# Patient Record
Sex: MALE | Race: WHITE
[De-identification: names, ages, dates, MRNs, and addresses within clinical notes are randomized per-mention and may not be internally consistent; named-entity substitution may affect disease eponyms.]

---

## 2018-05-06 ENCOUNTER — HOSPITAL ENCOUNTER (EMERGENCY)
Age: 73
Discharge: HOME | End: 2018-05-06

## 2018-05-06 ENCOUNTER — HOSPITAL ENCOUNTER (EMERGENCY)
Dept: HOSPITAL 91 - FTE | Age: 73
Discharge: HOME | End: 2018-05-06
Payer: MEDICARE

## 2018-05-06 DIAGNOSIS — E11.9: ICD-10-CM

## 2018-05-06 DIAGNOSIS — K59.00: ICD-10-CM

## 2018-05-06 DIAGNOSIS — K64.4: Primary | ICD-10-CM

## 2018-05-06 DIAGNOSIS — Z79.4: ICD-10-CM

## 2018-05-06 PROCEDURE — 99283 EMERGENCY DEPT VISIT LOW MDM: CPT

## 2018-05-06 RX ADMIN — GLYCERIN 1 SUPP: 2.1 SUPPOSITORY RECTAL at 11:12

## 2018-07-29 ENCOUNTER — HOSPITAL ENCOUNTER (INPATIENT)
Dept: HOSPITAL 12 - ER | Age: 73
LOS: 3 days | Discharge: NURSING, REHAB OR LONG-TERM CARE | DRG: 638 | End: 2018-08-01
Payer: MEDICARE

## 2018-07-29 VITALS — WEIGHT: 115 LBS | BODY MASS INDEX: 20.38 KG/M2 | HEIGHT: 63 IN

## 2018-07-29 VITALS — SYSTOLIC BLOOD PRESSURE: 113 MMHG | DIASTOLIC BLOOD PRESSURE: 62 MMHG

## 2018-07-29 VITALS — SYSTOLIC BLOOD PRESSURE: 123 MMHG | DIASTOLIC BLOOD PRESSURE: 67 MMHG

## 2018-07-29 DIAGNOSIS — N40.0: ICD-10-CM

## 2018-07-29 DIAGNOSIS — E86.0: ICD-10-CM
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
## 2018-07-29 DIAGNOSIS — F33.3: ICD-10-CM

## 2018-07-29 DIAGNOSIS — E83.42: ICD-10-CM

## 2018-07-29 DIAGNOSIS — E87.1: ICD-10-CM

## 2018-07-29 DIAGNOSIS — R51: ICD-10-CM

## 2018-07-29 DIAGNOSIS — E11.65: Primary | ICD-10-CM

## 2018-07-29 DIAGNOSIS — T50.1X5A: ICD-10-CM

## 2018-07-29 DIAGNOSIS — D49.7: ICD-10-CM

## 2018-07-29 LAB
BASOPHILS # BLD AUTO: 0 K/UL (ref 0–8)
BASOPHILS NFR BLD AUTO: 0.5 % (ref 0–2)
BUN SERPL-MCNC: 11 MG/DL (ref 7–18)
BUN SERPL-MCNC: 15 MG/DL (ref 7–18)
CHLORIDE SERPL-SCNC: 86 MMOL/L (ref 98–107)
CHLORIDE SERPL-SCNC: 94 MMOL/L (ref 98–107)
CO2 SERPL-SCNC: 26 MMOL/L (ref 21–32)
CO2 SERPL-SCNC: 28 MMOL/L (ref 21–32)
CREAT SERPL-MCNC: 0.7 MG/DL (ref 0.6–1.3)
CREAT SERPL-MCNC: 0.9 MG/DL (ref 0.6–1.3)
EOSINOPHIL # BLD AUTO: 0.1 K/UL (ref 0–0.7)
EOSINOPHIL NFR BLD AUTO: 1.3 % (ref 0–7)
GLUCOSE SERPL-MCNC: 137 MG/DL (ref 74–106)
GLUCOSE SERPL-MCNC: 358 MG/DL (ref 74–106)
HCT VFR BLD AUTO: 40.8 % (ref 36.7–47.1)
HGB BLD-MCNC: 13.8 G/DL (ref 12.5–16.3)
LYMPHOCYTES # BLD AUTO: 1.4 K/UL (ref 20–40)
LYMPHOCYTES NFR BLD AUTO: 18.1 % (ref 20.5–51.5)
MCH RBC QN AUTO: 28.4 UUG (ref 23.8–33.4)
MCHC RBC AUTO-ENTMCNC: 34 G/DL (ref 32.5–36.3)
MCV RBC AUTO: 84.2 FL (ref 73–96.2)
MONOCYTES # BLD AUTO: 0.6 K/UL (ref 2–10)
MONOCYTES NFR BLD AUTO: 7.7 % (ref 0–11)
NEUTROPHILS # BLD AUTO: 5.7 K/UL (ref 1.8–8.9)
NEUTROPHILS NFR BLD AUTO: 72.4 % (ref 38.5–71.5)
PLATELET # BLD AUTO: 311 K/UL (ref 152–348)
POTASSIUM SERPL-SCNC: 3.6 MMOL/L (ref 3.5–5.1)
POTASSIUM SERPL-SCNC: 4.1 MMOL/L (ref 3.5–5.1)
RBC # BLD AUTO: 4.85 MIL/UL (ref 4.06–5.63)
WBC # BLD AUTO: 7.9 K/UL (ref 3.6–10.2)

## 2018-07-29 PROCEDURE — A4663 DIALYSIS BLOOD PRESSURE CUFF: HCPCS

## 2018-07-29 RX ADMIN — SODIUM CHLORIDE PRN MLS/HR: 0.9 INJECTION, SOLUTION INTRAVENOUS at 16:38

## 2018-07-29 RX ADMIN — TEMAZEPAM PRN MG: 7.5 CAPSULE ORAL at 20:28

## 2018-07-29 RX ADMIN — SODIUM CHLORIDE PRN UNIT: 9 INJECTION, SOLUTION INTRAVENOUS at 16:57

## 2018-07-29 RX ADMIN — HYDROCODONE BITARTRATE AND ACETAMINOPHEN PRN TAB: 5; 325 TABLET ORAL at 16:46

## 2018-07-29 RX ADMIN — Medication SCH EACH: at 20:28

## 2018-07-29 RX ADMIN — TAMSULOSIN HYDROCHLORIDE SCH MG: 0.4 CAPSULE ORAL at 20:28

## 2018-07-29 RX ADMIN — SODIUM CHLORIDE PRN UNIT: 9 INJECTION, SOLUTION INTRAVENOUS at 20:36

## 2018-07-29 RX ADMIN — Medication SCH EACH: at 16:56

## 2018-07-29 NOTE — NUR
RECEIVED PATIENT IN BED ALERT, ORIENTED, SPEAK Pashto, BUT UNDERSTAND ENGLISH, KEPT 
COMFORTABLE. CALL LIGHT WITHIN REACH.

## 2018-07-29 NOTE — NUR
Per  pt to be admitted to m/s, Raulito Isaac NP has accepted the 
patient. SBAR report given to [place] ARTEAGA via telephone. Pt resting with no s/s of 
distress noted at this time.

## 2018-07-29 NOTE — NUR
Admitted pt.to room 218 to TELE ,no s/s of acute distress, c/o on pain in the head was 
medicated with Norco.

## 2018-07-30 VITALS — DIASTOLIC BLOOD PRESSURE: 46 MMHG | SYSTOLIC BLOOD PRESSURE: 96 MMHG

## 2018-07-30 VITALS — DIASTOLIC BLOOD PRESSURE: 73 MMHG | SYSTOLIC BLOOD PRESSURE: 148 MMHG

## 2018-07-30 VITALS — SYSTOLIC BLOOD PRESSURE: 128 MMHG | DIASTOLIC BLOOD PRESSURE: 70 MMHG

## 2018-07-30 VITALS — DIASTOLIC BLOOD PRESSURE: 62 MMHG | SYSTOLIC BLOOD PRESSURE: 109 MMHG

## 2018-07-30 VITALS — DIASTOLIC BLOOD PRESSURE: 67 MMHG | SYSTOLIC BLOOD PRESSURE: 112 MMHG

## 2018-07-30 LAB
ALP SERPL-CCNC: 71 U/L (ref 50–136)
ALT SERPL W/O P-5'-P-CCNC: 34 U/L (ref 16–63)
APPEARANCE UR: CLEAR
AST SERPL-CCNC: 16 U/L (ref 15–37)
BASOPHILS # BLD AUTO: 0 K/UL (ref 0–8)
BASOPHILS NFR BLD AUTO: 0.5 % (ref 0–2)
BILIRUB SERPL-MCNC: 0.4 MG/DL (ref 0.2–1)
BILIRUB UR QL STRIP: NEGATIVE
BUN SERPL-MCNC: 9 MG/DL (ref 7–18)
CHLORIDE SERPL-SCNC: 95 MMOL/L (ref 98–107)
CHOLEST SERPL-MCNC: 161 MG/DL (ref ?–200)
CO2 SERPL-SCNC: 26 MMOL/L (ref 21–32)
COLOR UR: YELLOW
CREAT SERPL-MCNC: 0.5 MG/DL (ref 0.6–1.3)
CREAT UR-MCNC: 17.4 MG/DL (ref 30–125)
DEPRECATED SQUAMOUS URNS QL MICRO: (no result) /HPF
EOSINOPHIL # BLD AUTO: 0.2 K/UL (ref 0–0.7)
EOSINOPHIL NFR BLD AUTO: 2.7 % (ref 0–7)
GLUCOSE SERPL-MCNC: 225 MG/DL (ref 74–106)
GLUCOSE UR STRIP-MCNC: (no result) MG/DL
HCT VFR BLD AUTO: 34.4 % (ref 36.7–47.1)
HDLC SERPL-MCNC: 66 MG/DL (ref 40–60)
HGB BLD-MCNC: 11.8 G/DL (ref 12.5–16.3)
HGB UR QL STRIP: NEGATIVE
KETONES UR STRIP-MCNC: (no result) MG/DL
LEUKOCYTE ESTERASE UR QL STRIP: NEGATIVE
LYMPHOCYTES # BLD AUTO: 1.5 K/UL (ref 20–40)
LYMPHOCYTES NFR BLD AUTO: 26.4 % (ref 20.5–51.5)
MAGNESIUM SERPL-MCNC: 1.2 MG/DL (ref 1.8–2.4)
MCH RBC QN AUTO: 28 UUG (ref 23.8–33.4)
MCHC RBC AUTO-ENTMCNC: 34 G/DL (ref 32.5–36.3)
MCV RBC AUTO: 81.9 FL (ref 73–96.2)
MONOCYTES # BLD AUTO: 0.4 K/UL (ref 2–10)
MONOCYTES NFR BLD AUTO: 7.6 % (ref 0–11)
NEUTROPHILS # BLD AUTO: 3.5 K/UL (ref 1.8–8.9)
NEUTROPHILS NFR BLD AUTO: 62.8 % (ref 38.5–71.5)
NITRITE UR QL STRIP: NEGATIVE
PH UR STRIP: 6 [PH] (ref 5–8)
PHOSPHATE SERPL-MCNC: 4 MG/DL (ref 2.5–4.9)
PLATELET # BLD AUTO: 267 K/UL (ref 152–348)
POTASSIUM SERPL-SCNC: 3.8 MMOL/L (ref 3.5–5.1)
PROT UR QL STRIP: NEGATIVE
PROT UR-MCNC: < 6 MG/DL
RBC # BLD AUTO: 4.2 MIL/UL (ref 4.06–5.63)
RBC #/AREA URNS HPF: (no result) /HPF (ref 0–3)
SP GR UR STRIP: 1.01 (ref 1–1.03)
TRIGL SERPL-MCNC: 66 MG/DL (ref 30–150)
UROBILINOGEN UR STRIP-MCNC: 0.2 E.U./DL
WBC # BLD AUTO: 5.6 K/UL (ref 3.6–10.2)
WBC #/AREA URNS HPF: (no result) /HPF
WBC #/AREA URNS HPF: (no result) /HPF (ref 0–3)
WS STN SPEC: 6.2 G/DL (ref 6.4–8.2)

## 2018-07-30 RX ADMIN — SODIUM CHLORIDE PRN UNIT: 9 INJECTION, SOLUTION INTRAVENOUS at 17:46

## 2018-07-30 RX ADMIN — Medication SCH EACH: at 06:10

## 2018-07-30 RX ADMIN — ACETAMINOPHEN PRN MG: 325 TABLET ORAL at 11:03

## 2018-07-30 RX ADMIN — Medication SCH EACH: at 12:52

## 2018-07-30 RX ADMIN — Medication SCH EACH: at 17:42

## 2018-07-30 RX ADMIN — TEMAZEPAM PRN MG: 7.5 CAPSULE ORAL at 21:47

## 2018-07-30 RX ADMIN — SODIUM CHLORIDE PRN UNIT: 9 INJECTION, SOLUTION INTRAVENOUS at 12:55

## 2018-07-30 RX ADMIN — Medication PRN MG: at 17:43

## 2018-07-30 RX ADMIN — TAMSULOSIN HYDROCHLORIDE SCH MG: 0.4 CAPSULE ORAL at 20:28

## 2018-07-30 RX ADMIN — Medication SCH EACH: at 20:40

## 2018-07-30 RX ADMIN — SODIUM CHLORIDE PRN MLS/HR: 0.9 INJECTION, SOLUTION INTRAVENOUS at 09:04

## 2018-07-30 RX ADMIN — ESCITALOPRAM OXALATE SCH MG: 10 TABLET, FILM COATED ORAL at 11:03

## 2018-07-30 RX ADMIN — SODIUM CHLORIDE PRN UNIT: 9 INJECTION, SOLUTION INTRAVENOUS at 08:57

## 2018-07-30 RX ADMIN — HYDROCODONE BITARTRATE AND ACETAMINOPHEN PRN TAB: 5; 325 TABLET ORAL at 09:04

## 2018-07-30 RX ADMIN — HYDROCODONE BITARTRATE AND ACETAMINOPHEN PRN TAB: 5; 325 TABLET ORAL at 05:24

## 2018-07-30 NOTE — NUR
PATIENT SLEPT MOST OF THE NIGHT, CONT ON PAIN MANAGEMENT DUE TO HEADACHES. NOTIFY JAY HENDRICKS NP MAG. 1.20 LEVEL, JAY SAID HE WILL ORDER SOME THING.

## 2018-07-30 NOTE — NUR
PATIENT ALERT ORIENTED, NO SOB NO CHEST PAIN NOTED, CONT ON PAIN MANAGEMENT DUE TO 
HEADACHES, CONT TO MONITOR.

## 2018-07-31 VITALS — SYSTOLIC BLOOD PRESSURE: 124 MMHG | DIASTOLIC BLOOD PRESSURE: 71 MMHG

## 2018-07-31 VITALS — DIASTOLIC BLOOD PRESSURE: 59 MMHG | SYSTOLIC BLOOD PRESSURE: 111 MMHG

## 2018-07-31 VITALS — SYSTOLIC BLOOD PRESSURE: 145 MMHG | DIASTOLIC BLOOD PRESSURE: 62 MMHG

## 2018-07-31 VITALS — DIASTOLIC BLOOD PRESSURE: 61 MMHG | SYSTOLIC BLOOD PRESSURE: 139 MMHG

## 2018-07-31 LAB
ALP SERPL-CCNC: 77 U/L (ref 50–136)
ALT SERPL W/O P-5'-P-CCNC: 33 U/L (ref 16–63)
AST SERPL-CCNC: 14 U/L (ref 15–37)
BASOPHILS # BLD AUTO: 0 K/UL (ref 0–8)
BASOPHILS NFR BLD AUTO: 0.8 % (ref 0–2)
BILIRUB SERPL-MCNC: 0.4 MG/DL (ref 0.2–1)
BUN SERPL-MCNC: 7 MG/DL (ref 7–18)
CHLORIDE SERPL-SCNC: 95 MMOL/L (ref 98–107)
CK SERPL-CCNC: 33 U/L (ref 39–308)
CO2 SERPL-SCNC: 26 MMOL/L (ref 21–32)
CREAT SERPL-MCNC: 0.6 MG/DL (ref 0.6–1.3)
EOSINOPHIL # BLD AUTO: 0.1 K/UL (ref 0–0.7)
EOSINOPHIL NFR BLD AUTO: 2.8 % (ref 0–7)
GLUCOSE SERPL-MCNC: 263 MG/DL (ref 74–106)
HCT VFR BLD AUTO: 37.5 % (ref 36.7–47.1)
HGB BLD-MCNC: 12.5 G/DL (ref 12.5–16.3)
LYMPHOCYTES # BLD AUTO: 1.3 K/UL (ref 20–40)
LYMPHOCYTES NFR BLD AUTO: 31.3 % (ref 20.5–51.5)
MAGNESIUM SERPL-MCNC: 1.6 MG/DL (ref 1.8–2.4)
MCH RBC QN AUTO: 27.9 UUG (ref 23.8–33.4)
MCHC RBC AUTO-ENTMCNC: 33 G/DL (ref 32.5–36.3)
MCV RBC AUTO: 83.9 FL (ref 73–96.2)
MONOCYTES # BLD AUTO: 0.4 K/UL (ref 2–10)
MONOCYTES NFR BLD AUTO: 8.5 % (ref 0–11)
NEUTROPHILS # BLD AUTO: 2.4 K/UL (ref 1.8–8.9)
NEUTROPHILS NFR BLD AUTO: 56.6 % (ref 38.5–71.5)
PHOSPHATE SERPL-MCNC: 3.4 MG/DL (ref 2.5–4.9)
PLATELET # BLD AUTO: 284 K/UL (ref 152–348)
POTASSIUM SERPL-SCNC: 3.9 MMOL/L (ref 3.5–5.1)
RBC # BLD AUTO: 4.48 MIL/UL (ref 4.06–5.63)
WBC # BLD AUTO: 4.3 K/UL (ref 3.6–10.2)
WS STN SPEC: 6.6 G/DL (ref 6.4–8.2)

## 2018-07-31 RX ADMIN — ESCITALOPRAM OXALATE SCH MG: 10 TABLET, FILM COATED ORAL at 08:24

## 2018-07-31 RX ADMIN — SODIUM CHLORIDE PRN UNIT: 9 INJECTION, SOLUTION INTRAVENOUS at 17:37

## 2018-07-31 RX ADMIN — Medication SCH EACH: at 17:36

## 2018-07-31 RX ADMIN — Medication SCH EACH: at 20:48

## 2018-07-31 RX ADMIN — HYDROCODONE BITARTRATE AND ACETAMINOPHEN PRN TAB: 5; 325 TABLET ORAL at 19:10

## 2018-07-31 RX ADMIN — SODIUM CHLORIDE PRN MLS/HR: 0.9 INJECTION, SOLUTION INTRAVENOUS at 02:53

## 2018-07-31 RX ADMIN — TAMSULOSIN HYDROCHLORIDE SCH MG: 0.4 CAPSULE ORAL at 20:40

## 2018-07-31 RX ADMIN — Medication SCH EACH: at 11:42

## 2018-07-31 RX ADMIN — Medication SCH EACH: at 06:24

## 2018-07-31 RX ADMIN — ACETAMINOPHEN PRN MG: 325 TABLET ORAL at 08:23

## 2018-07-31 RX ADMIN — SODIUM CHLORIDE PRN UNIT: 9 INJECTION, SOLUTION INTRAVENOUS at 11:46

## 2018-07-31 RX ADMIN — TEMAZEPAM PRN MG: 7.5 CAPSULE ORAL at 20:41

## 2018-07-31 RX ADMIN — SODIUM CHLORIDE PRN UNIT: 9 INJECTION, SOLUTION INTRAVENOUS at 08:29

## 2018-07-31 RX ADMIN — HYDROCODONE BITARTRATE AND ACETAMINOPHEN PRN TAB: 5; 325 TABLET ORAL at 14:14

## 2018-07-31 NOTE — NUR
AWAKE COOPERATE STATE H/A  GO DOWN LESS   THAN BEFORE  ON FALL PRECAUTION CALL LIGHT IN 
REACH AND BED ALARM ON CONTINUE IVF

## 2018-08-01 VITALS — DIASTOLIC BLOOD PRESSURE: 63 MMHG | SYSTOLIC BLOOD PRESSURE: 132 MMHG

## 2018-08-01 VITALS — DIASTOLIC BLOOD PRESSURE: 72 MMHG | SYSTOLIC BLOOD PRESSURE: 138 MMHG

## 2018-08-01 VITALS — DIASTOLIC BLOOD PRESSURE: 78 MMHG | SYSTOLIC BLOOD PRESSURE: 169 MMHG

## 2018-08-01 LAB
ALBUMIN SERPL ELPH-MCNC: 3.1 G/DL (ref 2.9–4.4)
ALBUMIN/GLOB SERPL: 1.2 {RATIO} (ref 0.7–1.7)
ALPHA1 GLOB SERPL ELPH-MCNC: 0.2 G/DL (ref 0–0.4)
ALPHA2 GLOB SERPL ELPH-MCNC: 0.8 G/DL (ref 0.4–1)
B-GLOBULIN SERPL ELPH-MCNC: 0.9 G/DL (ref 0.7–1.3)
BASOPHILS # BLD AUTO: 0 K/UL (ref 0–8)
BASOPHILS NFR BLD AUTO: 0.4 % (ref 0–2)
BUN SERPL-MCNC: 8 MG/DL (ref 7–18)
CHLORIDE SERPL-SCNC: 97 MMOL/L (ref 98–107)
CO2 SERPL-SCNC: 29 MMOL/L (ref 21–32)
CREAT SERPL-MCNC: 0.6 MG/DL (ref 0.6–1.3)
EOSINOPHIL # BLD AUTO: 0.1 K/UL (ref 0–0.7)
EOSINOPHIL NFR BLD AUTO: 1.7 % (ref 0–7)
GAMMA GLOB SERPL ELPH-MCNC: 0.7 G/DL (ref 0.4–1.8)
GLOBULIN SER CALC-MCNC: 2.6 G/DL (ref 2.2–3.9)
GLUCOSE SERPL-MCNC: 174 MG/DL (ref 74–106)
HCT VFR BLD AUTO: 36.8 % (ref 36.7–47.1)
HGB BLD-MCNC: 12.3 G/DL (ref 12.5–16.3)
LYMPHOCYTES # BLD AUTO: 1.6 K/UL (ref 20–40)
LYMPHOCYTES NFR BLD AUTO: 28.1 % (ref 20.5–51.5)
M PROTEIN SERPL ELPH-MCNC: 0.1 G/DL
MAGNESIUM SERPL-MCNC: 1.6 MG/DL (ref 1.8–2.4)
MCH RBC QN AUTO: 28 UUG (ref 23.8–33.4)
MCHC RBC AUTO-ENTMCNC: 34 G/DL (ref 32.5–36.3)
MCV RBC AUTO: 83.4 FL (ref 73–96.2)
MONOCYTES # BLD AUTO: 0.4 K/UL (ref 2–10)
MONOCYTES NFR BLD AUTO: 7.2 % (ref 0–11)
NEUTROPHILS # BLD AUTO: 3.6 K/UL (ref 1.8–8.9)
NEUTROPHILS NFR BLD AUTO: 62.6 % (ref 38.5–71.5)
PHOSPHATE SERPL-MCNC: 4.1 MG/DL (ref 2.5–4.9)
PLATELET # BLD AUTO: 305 K/UL (ref 152–348)
POTASSIUM SERPL-SCNC: 4 MMOL/L (ref 3.5–5.1)
RBC # BLD AUTO: 4.41 MIL/UL (ref 4.06–5.63)
WBC # BLD AUTO: 5.7 K/UL (ref 3.6–10.2)

## 2018-08-01 RX ADMIN — ESCITALOPRAM OXALATE SCH MG: 10 TABLET, FILM COATED ORAL at 08:25

## 2018-08-01 RX ADMIN — SODIUM CHLORIDE PRN MLS/HR: 0.9 INJECTION, SOLUTION INTRAVENOUS at 01:27

## 2018-08-01 RX ADMIN — HYDROCODONE BITARTRATE AND ACETAMINOPHEN PRN TAB: 5; 325 TABLET ORAL at 17:05

## 2018-08-01 RX ADMIN — Medication SCH EACH: at 06:45

## 2018-08-01 RX ADMIN — SODIUM CHLORIDE PRN UNIT: 9 INJECTION, SOLUTION INTRAVENOUS at 08:10

## 2018-08-01 RX ADMIN — ACETAMINOPHEN PRN MG: 325 TABLET ORAL at 08:26

## 2018-08-01 RX ADMIN — HYDROCODONE BITARTRATE AND ACETAMINOPHEN PRN TAB: 5; 325 TABLET ORAL at 06:07

## 2018-08-01 RX ADMIN — Medication SCH EACH: at 11:39

## 2018-08-01 RX ADMIN — Medication PRN MG: at 11:33

## 2018-08-01 RX ADMIN — Medication SCH EACH: at 17:05

## 2018-08-01 RX ADMIN — SODIUM CHLORIDE PRN UNIT: 9 INJECTION, SOLUTION INTRAVENOUS at 17:11

## 2018-08-01 RX ADMIN — HYDROCODONE BITARTRATE AND ACETAMINOPHEN PRN TAB: 5; 325 TABLET ORAL at 11:03

## 2018-08-01 RX ADMIN — SODIUM CHLORIDE PRN UNIT: 9 INJECTION, SOLUTION INTRAVENOUS at 11:41

## 2018-08-01 NOTE — NUR
D/C INSTRUCTION REGARDING  F/U WITH PMD CONTINUE HOME MEDICINE AS ORDER AND PRECRIPTION 
EDUCATION PK GIVEN ,VERBALIZES UNDERSTAND AND SIGNS D/C SHEET HL WAS D/C PRIOR D/C HOME 
TODAY FAMILY WIFE SHYAM WAS INFORM OF PATIENT DISCHARGE TODAY

## 2018-08-01 NOTE — NUR
DR ZAVALA HERE SEEN PATIENT AND PATIENT CONDITION INFORM NEW MEDICATION ADD ON    STATE HE 
WAS CLEAR TO DISCHARGE HOME TODAY

## 2018-08-01 NOTE — NUR
AWAKE COOPERATE WELL STATE H/A LESS BUT PAIN MORE AROUND THE MOUTH RESTING WELL WITH CALL 
LIGHT IN REACH

## 2020-02-25 ENCOUNTER — HOSPITAL ENCOUNTER (INPATIENT)
Dept: HOSPITAL 12 - ER | Age: 75
LOS: 2 days | Discharge: PSYCHIATRIC OR SUBSTANCE USE FACILITY | DRG: 637 | End: 2020-02-27
Attending: INTERNAL MEDICINE | Admitting: INTERNAL MEDICINE
Payer: MEDICARE

## 2020-02-25 VITALS — HEIGHT: 61 IN | BODY MASS INDEX: 24.92 KG/M2 | WEIGHT: 132 LBS

## 2020-02-25 VITALS — DIASTOLIC BLOOD PRESSURE: 51 MMHG | SYSTOLIC BLOOD PRESSURE: 122 MMHG

## 2020-02-25 DIAGNOSIS — K59.00: ICD-10-CM

## 2020-02-25 DIAGNOSIS — D50.9: ICD-10-CM

## 2020-02-25 DIAGNOSIS — F03.90: ICD-10-CM

## 2020-02-25 DIAGNOSIS — N39.0: ICD-10-CM

## 2020-02-25 DIAGNOSIS — K40.90: ICD-10-CM

## 2020-02-25 DIAGNOSIS — I11.9: ICD-10-CM

## 2020-02-25 DIAGNOSIS — N40.0: ICD-10-CM

## 2020-02-25 DIAGNOSIS — M19.90: ICD-10-CM

## 2020-02-25 DIAGNOSIS — K21.9: ICD-10-CM

## 2020-02-25 DIAGNOSIS — K76.0: ICD-10-CM

## 2020-02-25 DIAGNOSIS — F32.3: ICD-10-CM

## 2020-02-25 DIAGNOSIS — K76.89: ICD-10-CM

## 2020-02-25 DIAGNOSIS — F41.9: ICD-10-CM

## 2020-02-25 DIAGNOSIS — Z79.84: ICD-10-CM

## 2020-02-25 DIAGNOSIS — G92: ICD-10-CM

## 2020-02-25 DIAGNOSIS — Z79.899: ICD-10-CM

## 2020-02-25 DIAGNOSIS — G89.29: ICD-10-CM

## 2020-02-25 DIAGNOSIS — E11.65: Primary | ICD-10-CM

## 2020-02-25 LAB
ALP SERPL-CCNC: 80 U/L (ref 50–136)
ALT SERPL W/O P-5'-P-CCNC: 35 U/L (ref 16–63)
AMPHETAMINES UR QL SCN>1000 NG/ML: NEGATIVE
APAP SERPL-MCNC: < 2 UG/ML (ref 10–30)
APPEARANCE UR: (no result)
AST SERPL-CCNC: 29 U/L (ref 15–37)
BARBITURATES UR QL SCN: NEGATIVE
BASOPHILS # BLD AUTO: 0 K/UL (ref 0–8)
BASOPHILS NFR BLD AUTO: 0.5 % (ref 0–2)
BILIRUB DIRECT SERPL-MCNC: 0.1 MG/DL (ref 0–0.2)
BILIRUB SERPL-MCNC: 0.3 MG/DL (ref 0.2–1)
BILIRUB UR QL STRIP: NEGATIVE
BUN SERPL-MCNC: 11 MG/DL (ref 7–18)
CHLORIDE SERPL-SCNC: 98 MMOL/L (ref 98–107)
CO2 SERPL-SCNC: 31 MMOL/L (ref 21–32)
COCAINE UR QL SCN: NEGATIVE
COLOR UR: YELLOW
CREAT SERPL-MCNC: 0.8 MG/DL (ref 0.6–1.3)
DEPRECATED SQUAMOUS URNS QL MICRO: (no result) /HPF
EOSINOPHIL # BLD AUTO: 0.2 K/UL (ref 0–0.7)
EOSINOPHIL NFR BLD AUTO: 2.8 % (ref 0–7)
EOSINOPHIL NFR BLD MANUAL: 2 % (ref 0–8)
ETHANOL SERPL-MCNC: < 3 MG/DL (ref 0–0)
GLUCOSE SERPL-MCNC: 266 MG/DL (ref 74–106)
GLUCOSE UR STRIP-MCNC: NEGATIVE MG/DL
HCT VFR BLD AUTO: 29.1 % (ref 36.7–47.1)
HGB BLD-MCNC: 9.3 G/DL (ref 12.5–16.3)
HGB UR QL STRIP: NEGATIVE
KETONES UR STRIP-MCNC: NEGATIVE MG/DL
LEUKOCYTE ESTERASE UR QL STRIP: NEGATIVE
LYMPHOCYTES # BLD AUTO: 1.5 K/UL (ref 20–40)
LYMPHOCYTES NFR BLD AUTO: 21.2 % (ref 20.5–51.5)
LYMPHOCYTES NFR BLD MANUAL: 22 % (ref 20–40)
MCH RBC QN AUTO: 22.1 UUG (ref 23.8–33.4)
MCHC RBC AUTO-ENTMCNC: 32 G/DL (ref 32.5–36.3)
MCV RBC AUTO: 69.4 FL (ref 73–96.2)
MONOCYTES # BLD AUTO: 0.4 K/UL (ref 2–10)
MONOCYTES NFR BLD AUTO: 6.6 % (ref 0–11)
MONOCYTES NFR BLD MANUAL: 5 % (ref 2–10)
NEUTROPHILS # BLD AUTO: 4.7 K/UL (ref 1.8–8.9)
NEUTROPHILS NFR BLD AUTO: 68.9 % (ref 38.5–71.5)
NEUTS SEG NFR BLD MANUAL: 71 % (ref 42–75)
NITRITE UR QL STRIP: NEGATIVE
OPIATES UR QL SCN: NEGATIVE
PCP UR QL SCN>25 NG/ML: NEGATIVE
PH UR STRIP: 5.5 [PH] (ref 5–8)
PLATELET # BLD AUTO: 364 K/UL (ref 152–348)
POTASSIUM SERPL-SCNC: 4.6 MMOL/L (ref 3.5–5.1)
RBC # BLD AUTO: 4.19 MIL/UL (ref 4.06–5.63)
RBC #/AREA URNS HPF: (no result) /HPF (ref 0–3)
SP GR UR STRIP: >=1.03 (ref 1–1.03)
THC UR QL SCN>50 NG/ML: NEGATIVE
UROBILINOGEN UR STRIP-MCNC: 0.2 E.U./DL
WBC # BLD AUTO: 6.8 K/UL (ref 3.6–10.2)
WBC #/AREA URNS HPF: (no result) /HPF
WBC #/AREA URNS HPF: (no result) /HPF (ref 0–3)
WS STN SPEC: 7.4 G/DL (ref 6.4–8.2)

## 2020-02-25 PROCEDURE — G0480 DRUG TEST DEF 1-7 CLASSES: HCPCS

## 2020-02-25 PROCEDURE — A4663 DIALYSIS BLOOD PRESSURE CUFF: HCPCS

## 2020-02-25 PROCEDURE — G0378 HOSPITAL OBSERVATION PER HR: HCPCS

## 2020-02-25 RX ADMIN — SODIUM CHLORIDE PRN UNIT: 9 INJECTION, SOLUTION INTRAVENOUS at 21:41

## 2020-02-25 RX ADMIN — DEXTROSE SCH MLS/HR: 50 INJECTION, SOLUTION INTRAVENOUS at 22:20

## 2020-02-25 RX ADMIN — TEMAZEPAM PRN MG: 15 CAPSULE ORAL at 22:56

## 2020-02-25 RX ADMIN — Medication SCH EACH: at 21:36

## 2020-02-25 NOTE — NUR
PATIENT IS IN ROOM 3.  HE IS AWAKE AND ALERT.  HE STATES HIS GROIN AREA HURTS 
AT TIMES. DR SOLER AWARE.

## 2020-02-25 NOTE — NUR
RECEIVED FOR ADMISSION FROM ED 74 YEARS OLD MALE BY JULIO TO ROOM 319 WITH DX OF CYSTITIS 
AND ANEMIA PLACED INTO BED FIXED AND MADE COMFORTABLE PATIENT IS ALERT AND VERBALLY 
RESPONSIVE ORIENTED TO ROOM AND FACILITY PROTOCOL PATIENTS PERSONAL BELONGINGS PLACED IN THE 
CONTRABAND LOCKER PER PROTOCOL AT THIS TIME.ON ROOM AIR WITH NO SOB ADMISSION OF THIS 
PATIENT WILL BE ENDORSED TO ONCOMING SHIFT.

## 2020-02-25 NOTE — NUR
RECEIVED PATIENT IN BED, AWAKE. 1:1 SITTER AT BEDSIDE. PATIENT IS CONFUSED BUT FOLLOWS 
COMMANDS AND IS ABLE TO MAKE NEEDS KNOW. NO SHORTNESS OF BREATH, OR DISTRESS NOTED. COMFORT 
PROVIDED, IMMEDIATE NEEDS ATTENDED. SAFETY PRECAUTIONS IN PLACE. WILL CONTINUE TO MONITOR.

## 2020-02-26 VITALS — SYSTOLIC BLOOD PRESSURE: 142 MMHG | DIASTOLIC BLOOD PRESSURE: 66 MMHG

## 2020-02-26 LAB
ALP SERPL-CCNC: 76 U/L (ref 50–136)
ALT SERPL W/O P-5'-P-CCNC: 30 U/L (ref 16–63)
AST SERPL-CCNC: 22 U/L (ref 15–37)
BASOPHILS # BLD AUTO: 0 K/UL (ref 0–8)
BASOPHILS NFR BLD AUTO: 0.7 % (ref 0–2)
BILIRUB SERPL-MCNC: 0.2 MG/DL (ref 0.2–1)
BUN SERPL-MCNC: 13 MG/DL (ref 7–18)
CHLORIDE SERPL-SCNC: 98 MMOL/L (ref 98–107)
CHOLEST SERPL-MCNC: 175 MG/DL (ref ?–200)
CO2 SERPL-SCNC: 32 MMOL/L (ref 21–32)
CREAT SERPL-MCNC: 0.7 MG/DL (ref 0.6–1.3)
EOSINOPHIL # BLD AUTO: 0.3 K/UL (ref 0–0.7)
EOSINOPHIL NFR BLD AUTO: 5.3 % (ref 0–7)
EOSINOPHIL NFR BLD MANUAL: 4 % (ref 0–8)
GLUCOSE SERPL-MCNC: 214 MG/DL (ref 74–106)
HCT VFR BLD AUTO: 31.3 % (ref 36.7–47.1)
HDLC SERPL-MCNC: 50 MG/DL (ref 40–60)
HGB BLD-MCNC: 9.8 G/DL (ref 12.5–16.3)
IRON SERPL-MCNC: 16 UG/DL (ref 50–175)
LYMPHOCYTES # BLD AUTO: 1.7 K/UL (ref 20–40)
LYMPHOCYTES NFR BLD AUTO: 26.7 % (ref 20.5–51.5)
LYMPHOCYTES NFR BLD MANUAL: 26 % (ref 20–40)
MAGNESIUM SERPL-MCNC: 1.7 MG/DL (ref 1.8–2.4)
MCH RBC QN AUTO: 21.7 UUG (ref 23.8–33.4)
MCHC RBC AUTO-ENTMCNC: 31 G/DL (ref 32.5–36.3)
MCV RBC AUTO: 69.5 FL (ref 73–96.2)
MONOCYTES # BLD AUTO: 0.5 K/UL (ref 2–10)
MONOCYTES NFR BLD AUTO: 8 % (ref 0–11)
MONOCYTES NFR BLD MANUAL: 9 % (ref 2–10)
NEUTROPHILS # BLD AUTO: 3.7 K/UL (ref 1.8–8.9)
NEUTROPHILS NFR BLD AUTO: 59.3 % (ref 38.5–71.5)
NEUTS SEG NFR BLD MANUAL: 61 % (ref 42–75)
PHOSPHATE SERPL-MCNC: 3.3 MG/DL (ref 2.5–4.9)
PLATELET # BLD AUTO: 360 K/UL (ref 152–348)
POTASSIUM SERPL-SCNC: 4.6 MMOL/L (ref 3.5–5.1)
RBC # BLD AUTO: 4.5 MIL/UL (ref 4.06–5.63)
TRIGL SERPL-MCNC: 80 MG/DL (ref 30–150)
TSH SERPL DL<=0.005 MIU/L-ACNC: 3.43 MIU/ML (ref 0.36–3.74)
WBC # BLD AUTO: 6.2 K/UL (ref 3.6–10.2)
WS STN SPEC: 7.4 G/DL (ref 6.4–8.2)

## 2020-02-26 RX ADMIN — DOCUSATE SODIUM SCH MG: 100 CAPSULE, LIQUID FILLED ORAL at 17:48

## 2020-02-26 RX ADMIN — METFORMIN HYDROCHLORIDE SCH MG: 500 TABLET ORAL at 17:48

## 2020-02-26 RX ADMIN — BENAZEPRIL HYDROCHLORIDE SCH MG: 20 TABLET, FILM COATED ORAL at 21:06

## 2020-02-26 RX ADMIN — GABAPENTIN SCH MG: 300 CAPSULE ORAL at 09:41

## 2020-02-26 RX ADMIN — Medication SCH MG: at 09:42

## 2020-02-26 RX ADMIN — SODIUM CHLORIDE PRN UNIT: 9 INJECTION, SOLUTION INTRAVENOUS at 17:56

## 2020-02-26 RX ADMIN — SODIUM CHLORIDE PRN UNIT: 9 INJECTION, SOLUTION INTRAVENOUS at 12:21

## 2020-02-26 RX ADMIN — SODIUM CHLORIDE PRN UNIT: 9 INJECTION, SOLUTION INTRAVENOUS at 09:45

## 2020-02-26 RX ADMIN — BENAZEPRIL HYDROCHLORIDE SCH MG: 20 TABLET, FILM COATED ORAL at 09:42

## 2020-02-26 RX ADMIN — SODIUM CHLORIDE PRN UNIT: 9 INJECTION, SOLUTION INTRAVENOUS at 21:18

## 2020-02-26 RX ADMIN — Medication SCH EACH: at 17:20

## 2020-02-26 RX ADMIN — Medication SCH EACH: at 11:40

## 2020-02-26 RX ADMIN — GABAPENTIN SCH MG: 300 CAPSULE ORAL at 17:48

## 2020-02-26 RX ADMIN — DEXTROSE SCH MLS/HR: 50 INJECTION, SOLUTION INTRAVENOUS at 21:00

## 2020-02-26 RX ADMIN — Medication SCH MG: at 21:06

## 2020-02-26 RX ADMIN — Medication SCH EACH: at 06:30

## 2020-02-26 RX ADMIN — TEMAZEPAM PRN MG: 15 CAPSULE ORAL at 22:23

## 2020-02-26 RX ADMIN — LINAGLIPTIN SCH MG: 5 TABLET, FILM COATED ORAL at 09:41

## 2020-02-26 RX ADMIN — METFORMIN HYDROCHLORIDE SCH MG: 500 TABLET ORAL at 09:40

## 2020-02-26 RX ADMIN — Medication SCH EACH: at 21:12

## 2020-02-26 RX ADMIN — PANTOPRAZOLE SODIUM SCH MG: 40 TABLET, DELAYED RELEASE ORAL at 06:03

## 2020-02-26 RX ADMIN — DOCUSATE SODIUM SCH MG: 100 CAPSULE, LIQUID FILLED ORAL at 09:40

## 2020-02-26 NOTE — NUR
RECEIVED PATIENT ASLEEP IN BED WITH 1: 1 SITTER FOR SAFETY.  NO S/S OF ACUTE DISTRESS NOTED. 
NO C/O PAIN AT  THIS TIME.  SAFETY AND COMFORT  PROVIDED . CALL LIGHT WITHIN REACH. WILL 
CONTINUE TO MONITOR

## 2020-02-26 NOTE — NUR
PATIENT SLEPT WELL THROUGHOUT THE NIGHT. 1:1 SITTER AT BEDSIDE. NO ACUTE CHANGE IN PATIENT 
CONDITION. TOLERATED MEDICATIONS. COMFORT PROVIDED. WILL ENDORSE ACCORDINGLY.

## 2020-02-26 NOTE — NUR
IV site infiltrated. IV cath removed from right forearm and ice pack applied.  Will continue 
to monitor and assess.

## 2020-02-26 NOTE — NUR
Patient received into care, laying in bed, resting comfortably with 1:1 sitter at bedside.  
Patient is alert/oriented x2 and has no complaints of pain or discomfort at this time.  All 
safety and fall precaution measures are in place.  Personal items are within reach at all 
times.  Will continue to monitor and assess.

## 2020-02-26 NOTE — NUR
RECEIVED PATIENT ALERT AND AWAKE IN BED WITH 1: 1 SITTER FOR SAFETY.  CALM AND COOPERATIVE. 
NO S/S OF ACUTE DISTRESS NOTED. NO C/O PAIN AT THIS TIME.  SAFETY AND COMFORT  PROVIDED. 
CALL LIGHT WITHIN REACH. WILL CONTINUE TO MONITOR

## 2020-02-27 ENCOUNTER — HOSPITAL ENCOUNTER (INPATIENT)
Dept: HOSPITAL 54 - GPS | Age: 75
LOS: 7 days | Discharge: NURSING, REHAB OR LONG-TERM CARE | DRG: 885 | End: 2020-03-05
Attending: INTERNAL MEDICINE | Admitting: PSYCHIATRY & NEUROLOGY
Payer: MEDICARE

## 2020-02-27 VITALS — DIASTOLIC BLOOD PRESSURE: 59 MMHG | SYSTOLIC BLOOD PRESSURE: 116 MMHG

## 2020-02-27 VITALS — HEIGHT: 60 IN | BODY MASS INDEX: 25.13 KG/M2 | WEIGHT: 128 LBS

## 2020-02-27 VITALS — SYSTOLIC BLOOD PRESSURE: 140 MMHG | DIASTOLIC BLOOD PRESSURE: 76 MMHG

## 2020-02-27 VITALS — SYSTOLIC BLOOD PRESSURE: 120 MMHG | DIASTOLIC BLOOD PRESSURE: 62 MMHG

## 2020-02-27 DIAGNOSIS — N39.0: ICD-10-CM

## 2020-02-27 DIAGNOSIS — Z79.84: ICD-10-CM

## 2020-02-27 DIAGNOSIS — E11.649: ICD-10-CM

## 2020-02-27 DIAGNOSIS — G89.4: ICD-10-CM

## 2020-02-27 DIAGNOSIS — E78.5: ICD-10-CM

## 2020-02-27 DIAGNOSIS — M19.90: ICD-10-CM

## 2020-02-27 DIAGNOSIS — I10: ICD-10-CM

## 2020-02-27 DIAGNOSIS — Z79.899: ICD-10-CM

## 2020-02-27 DIAGNOSIS — J98.11: ICD-10-CM

## 2020-02-27 DIAGNOSIS — F17.200: ICD-10-CM

## 2020-02-27 DIAGNOSIS — K59.00: ICD-10-CM

## 2020-02-27 DIAGNOSIS — F20.0: Primary | ICD-10-CM

## 2020-02-27 DIAGNOSIS — F29: ICD-10-CM

## 2020-02-27 DIAGNOSIS — D50.9: ICD-10-CM

## 2020-02-27 DIAGNOSIS — N40.0: ICD-10-CM

## 2020-02-27 DIAGNOSIS — E11.40: ICD-10-CM

## 2020-02-27 RX ADMIN — HUMAN INSULIN PRN UNIT: 100 INJECTION, SOLUTION SUBCUTANEOUS at 21:52

## 2020-02-27 RX ADMIN — Medication SCH EACH: at 18:24

## 2020-02-27 RX ADMIN — Medication SCH MG: at 08:11

## 2020-02-27 RX ADMIN — DOCUSATE SODIUM SCH MG: 100 CAPSULE, LIQUID FILLED ORAL at 08:11

## 2020-02-27 RX ADMIN — BENAZEPRIL HYDROCHLORIDE SCH MG: 20 TABLET, FILM COATED ORAL at 08:11

## 2020-02-27 RX ADMIN — Medication SCH EACH: at 21:48

## 2020-02-27 RX ADMIN — INSULIN HUMAN PRN UNIT: 100 INJECTION, SOLUTION PARENTERAL at 18:23

## 2020-02-27 RX ADMIN — SODIUM CHLORIDE PRN UNIT: 9 INJECTION, SOLUTION INTRAVENOUS at 07:45

## 2020-02-27 RX ADMIN — METFORMIN HYDROCHLORIDE SCH MG: 500 TABLET ORAL at 08:11

## 2020-02-27 RX ADMIN — SIMVASTATIN SCH MG: 10 TABLET, FILM COATED ORAL at 21:55

## 2020-02-27 RX ADMIN — Medication SCH EACH: at 10:41

## 2020-02-27 RX ADMIN — LINAGLIPTIN SCH MG: 5 TABLET, FILM COATED ORAL at 08:11

## 2020-02-27 RX ADMIN — GABAPENTIN SCH MG: 300 CAPSULE ORAL at 08:11

## 2020-02-27 RX ADMIN — Medication SCH EACH: at 06:33

## 2020-02-27 RX ADMIN — PANTOPRAZOLE SODIUM SCH MG: 40 TABLET, DELAYED RELEASE ORAL at 06:20

## 2020-02-27 RX ADMIN — SODIUM CHLORIDE PRN UNIT: 9 INJECTION, SOLUTION INTRAVENOUS at 11:01

## 2020-02-27 NOTE — NUR
GPS /RN-NOTES

DR. PIERRE ( PSYCHIATRIST) MADE AWARE OF PATIENT ADMISSION WITH T.O ORDERS TO CONTINUE ALL 
MEDICATIONS FROM [place] INCLUDING ATIVAN 1MG P.O Q4HRS. PRN. NOTED AND CARRIED OUT. DR. CLINTON ALSO MADE AWARE WITH T.O ORDER TO CONTINUE ALL MEDICATIONS FROM [facility] AND 
STATED THAT HE WILL COME AND SEE THE PATIENT TONIGHT.

## 2020-02-27 NOTE — NUR
DC ORDERS RECEIVED NOTED AND CARRIED OUT.DC INSTRUCTION AND RN REPORT GIVEN TO [facility] ,PT LEFT THE FACILITY VIA AMBULANCES IN STABLE CONDITION

## 2020-02-27 NOTE — NUR
Patient slept throughout night with 1:1 sitter at bedside.  Patient was compliant with all 
aspects of care and medicine regime.  Patient had no complaints of pain this shift, nor were 
there any signs/symptoms of acute distress or discomfort observed/noted by nurse.  All 
nursing needs were met promptly.  Patient is warm, dry, and comfortable.  Safety and fall 
precaution measures remain in place.  Personal items remain within reach.

## 2020-02-27 NOTE — NUR
GPS/RN-NOTES

 ADMITTED 74 Y.O Israeli MALE PATIENT FROM [facility]. PATIENT ON 14 DAY HOLD FOR GD 
ADULT. UPON FACE TO FACE ASSESSMENT PATIENT ALERT ORIENTED X3 ,CALM AND COOPERATIVE  ANSWER 
ALL QUESTIONS. HOLD WAS REVIEWED  WITH THE PATIENT, PATIENT'S RIGHT WAS REVIEWED AND GIVEN 
TO HIM. CONTRABAND AND FULL BODY ASSESSMENT DONE. DR. PIERRE( PSYCHIATRIST)  MADE AWARE WITH 
ORDERS ALSO DR. CLINTON ( INTERNIST ) AWARE AND STATED  HE WILL COME SEE THE PATIENT TONIGHT. 
PATIENT WAS ORIENTED IN THE UNIT AND UNIT POLICIES. CALLED SAYDA KAMINSKI AT 
[phone] AND LEFT  A VOICE MSG. PATIENT AMBULATORY USING WALKER.PATIENT WAS COOPERATIVE 
DURING ADMISSION PROCESS. WILL ENDORSE TO INCOMING SHIFT FOR ADMISSION PROCESS AND 
CONTINUITY OF CARE .

## 2020-02-27 NOTE — NUR
Patient will not allow nurse to replace infiltrated IV cath. Will have AM shift follow-up 
and replace.

## 2020-02-28 VITALS — SYSTOLIC BLOOD PRESSURE: 123 MMHG | DIASTOLIC BLOOD PRESSURE: 71 MMHG

## 2020-02-28 VITALS — SYSTOLIC BLOOD PRESSURE: 111 MMHG | DIASTOLIC BLOOD PRESSURE: 61 MMHG

## 2020-02-28 VITALS — SYSTOLIC BLOOD PRESSURE: 100 MMHG | DIASTOLIC BLOOD PRESSURE: 62 MMHG

## 2020-02-28 LAB
ALBUMIN SERPL BCP-MCNC: 3.2 G/DL (ref 3.4–5)
ALP SERPL-CCNC: 85 U/L (ref 46–116)
ALT SERPL W P-5'-P-CCNC: 22 U/L (ref 12–78)
AST SERPL W P-5'-P-CCNC: 16 U/L (ref 15–37)
BASOPHILS # BLD AUTO: 0 /CMM (ref 0–0.2)
BASOPHILS NFR BLD AUTO: 0.5 % (ref 0–2)
BILIRUB SERPL-MCNC: 0.3 MG/DL (ref 0.2–1)
BUN SERPL-MCNC: 14 MG/DL (ref 7–18)
CALCIUM SERPL-MCNC: 9 MG/DL (ref 8.5–10.1)
CHLORIDE SERPL-SCNC: 96 MMOL/L (ref 98–107)
CHOLEST SERPL-MCNC: 151 MG/DL (ref ?–200)
CO2 SERPL-SCNC: 30 MMOL/L (ref 21–32)
CREAT SERPL-MCNC: 0.8 MG/DL (ref 0.6–1.3)
CRP SERPL-MCNC: 0.2 MG/DL (ref 0–0.9)
EOSINOPHIL NFR BLD AUTO: 3.6 % (ref 0–6)
GLUCOSE SERPL-MCNC: 242 MG/DL (ref 74–106)
HCT VFR BLD AUTO: 32 % (ref 39–51)
HDLC SERPL-MCNC: 50 MG/DL (ref 40–60)
HGB BLD-MCNC: 10 G/DL (ref 13.5–17.5)
LDLC SERPL DIRECT ASSAY-MCNC: 92 MG/DL (ref 0–99)
LYMPHOCYTES NFR BLD AUTO: 1.3 /CMM (ref 0.8–4.8)
LYMPHOCYTES NFR BLD AUTO: 21.4 % (ref 20–44)
MCHC RBC AUTO-ENTMCNC: 31 G/DL (ref 31–36)
MCV RBC AUTO: 69 FL (ref 80–96)
MONOCYTES NFR BLD AUTO: 0.4 /CMM (ref 0.1–1.3)
MONOCYTES NFR BLD AUTO: 6.2 % (ref 2–12)
NEUTROPHILS # BLD AUTO: 4.3 /CMM (ref 1.8–8.9)
NEUTROPHILS NFR BLD AUTO: 68.3 % (ref 43–81)
PLATELET # BLD AUTO: 394 /CMM (ref 150–450)
POTASSIUM SERPL-SCNC: 4.4 MMOL/L (ref 3.5–5.1)
PROT SERPL-MCNC: 7.5 G/DL (ref 6.4–8.2)
PSA FREE SERPL-MCNC: 1.08 NG/ML (ref 0–45)
PSA SERPL-MCNC: 9.2 NG/ML (ref 0–4)
RBC # BLD AUTO: 4.67 MIL/UL (ref 4.5–6)
SODIUM SERPL-SCNC: 131 MMOL/L (ref 136–145)
TRIGL SERPL-MCNC: 76 MG/DL (ref 30–150)
WBC NRBC COR # BLD AUTO: 6.3 K/UL (ref 4.3–11)

## 2020-02-28 RX ADMIN — METFORMIN HYDROCHLORIDE SCH MG: 500 TABLET, FILM COATED ORAL at 08:49

## 2020-02-28 RX ADMIN — GABAPENTIN SCH MG: 300 CAPSULE ORAL at 16:56

## 2020-02-28 RX ADMIN — Medication SCH EACH: at 11:43

## 2020-02-28 RX ADMIN — INSULIN HUMAN PRN UNIT: 100 INJECTION, SOLUTION PARENTERAL at 07:39

## 2020-02-28 RX ADMIN — INSULIN HUMAN PRN UNIT: 100 INJECTION, SOLUTION PARENTERAL at 11:46

## 2020-02-28 RX ADMIN — HUMAN INSULIN PRN UNIT: 100 INJECTION, SOLUTION SUBCUTANEOUS at 22:00

## 2020-02-28 RX ADMIN — PANTOPRAZOLE SODIUM SCH MG: 40 TABLET, DELAYED RELEASE ORAL at 08:50

## 2020-02-28 RX ADMIN — Medication SCH EACH: at 17:17

## 2020-02-28 RX ADMIN — AMLODIPINE BESYLATE SCH MG: 2.5 TABLET ORAL at 08:50

## 2020-02-28 RX ADMIN — Medication SCH MG: at 08:49

## 2020-02-28 RX ADMIN — AMLODIPINE BESYLATE SCH MG: 2.5 TABLET ORAL at 16:53

## 2020-02-28 RX ADMIN — Medication SCH EACH: at 07:41

## 2020-02-28 RX ADMIN — SIMVASTATIN SCH MG: 10 TABLET, FILM COATED ORAL at 21:49

## 2020-02-28 RX ADMIN — Medication SCH EACH: at 21:53

## 2020-02-28 RX ADMIN — METFORMIN HYDROCHLORIDE SCH MG: 500 TABLET, FILM COATED ORAL at 16:56

## 2020-02-28 RX ADMIN — LINAGLIPTIN SCH MG: 5 TABLET, FILM COATED ORAL at 08:50

## 2020-02-28 RX ADMIN — GABAPENTIN SCH MG: 300 CAPSULE ORAL at 08:49

## 2020-02-28 RX ADMIN — Medication SCH MG: at 16:56

## 2020-02-28 NOTE — NUR
gps lvn: notes



bs check=59, re check=65. no s/s of hypo/hyperglycemia reactions noted. orange juice given. 
dinner served.

## 2020-02-28 NOTE — NUR
Family Contact: SW called the pts daughter, Manju ([phone]), and left a voicemail 
stating that the SW would like to speak to her regarding the pts treatment plan and initial 
discharge plan.

## 2020-02-28 NOTE — NUR
GPS OPENING NOTES:



PATIENT AWAKE, ALERT AN ORIENTED X 2-3, CALM, COOPERATIVE, PACING, DENIES 

SI/HI, DENIES AH/VH, NO SOB, NO ACUTE DISTRESS, BREATHING EVEN AND 

UNLABORED, NO S/S OF PAIN AND DISCOMFORT, WILL CONTINUE TO MONITOR Q15 

MINS FOR SAFETY

## 2020-02-28 NOTE — NUR
Facility Contact: SW called [facility] ([phone]) and spoke to Emilee 
who stated that the pt can be discharged back to their facility once he is discharged from 
the hospital.

## 2020-02-28 NOTE — NUR
Initial Discharge Plan: Pt currently resides at [facility] located at 
[address] [place] [address], [address], [place] [address]; ([phone]). Per pt, he would like 
to return to his home. SW will work with the pt and the MD regarding appropriate discharge 
planning. SW will form a safe and proper discharge.

## 2020-02-29 VITALS — DIASTOLIC BLOOD PRESSURE: 78 MMHG | SYSTOLIC BLOOD PRESSURE: 135 MMHG

## 2020-02-29 VITALS — SYSTOLIC BLOOD PRESSURE: 119 MMHG | DIASTOLIC BLOOD PRESSURE: 68 MMHG

## 2020-02-29 VITALS — DIASTOLIC BLOOD PRESSURE: 70 MMHG | SYSTOLIC BLOOD PRESSURE: 118 MMHG

## 2020-02-29 LAB
APPEARANCE UR: CLEAR
BILIRUB UR QL STRIP: NEGATIVE
COLOR UR: YELLOW
GLUCOSE UR STRIP-MCNC: (no result) MG/DL
HGB UR QL STRIP: NEGATIVE ERY/UL
KETONES UR STRIP-MCNC: NEGATIVE MG/DL
LEUKOCYTE ESTERASE UR QL STRIP: NEGATIVE
NITRITE UR QL STRIP: NEGATIVE
PH UR STRIP: 6 [PH] (ref 5–8)
PROT UR QL STRIP: NEGATIVE MG/DL
UROBILINOGEN UR STRIP-MCNC: 0.2 EU/DL

## 2020-02-29 RX ADMIN — AMLODIPINE BESYLATE SCH MG: 2.5 TABLET ORAL at 17:00

## 2020-02-29 RX ADMIN — METFORMIN HYDROCHLORIDE SCH MG: 500 TABLET, FILM COATED ORAL at 17:00

## 2020-02-29 RX ADMIN — Medication SCH MG: at 17:32

## 2020-02-29 RX ADMIN — Medication SCH EACH: at 12:14

## 2020-02-29 RX ADMIN — INSULIN HUMAN PRN UNIT: 100 INJECTION, SOLUTION PARENTERAL at 11:56

## 2020-02-29 RX ADMIN — Medication SCH EACH: at 21:23

## 2020-02-29 RX ADMIN — Medication SCH MG: at 09:00

## 2020-02-29 RX ADMIN — TEMAZEPAM PRN MG: 7.5 CAPSULE ORAL at 02:20

## 2020-02-29 RX ADMIN — TEMAZEPAM PRN MG: 7.5 CAPSULE ORAL at 22:23

## 2020-02-29 RX ADMIN — INSULIN HUMAN PRN UNIT: 100 INJECTION, SOLUTION PARENTERAL at 09:36

## 2020-02-29 RX ADMIN — METFORMIN HYDROCHLORIDE SCH MG: 500 TABLET, FILM COATED ORAL at 09:45

## 2020-02-29 RX ADMIN — HUMAN INSULIN PRN UNIT: 100 INJECTION, SOLUTION SUBCUTANEOUS at 21:25

## 2020-02-29 RX ADMIN — Medication SCH EACH: at 08:34

## 2020-02-29 RX ADMIN — GABAPENTIN SCH MG: 300 CAPSULE ORAL at 17:33

## 2020-02-29 RX ADMIN — LINAGLIPTIN SCH MG: 5 TABLET, FILM COATED ORAL at 11:53

## 2020-02-29 RX ADMIN — PANTOPRAZOLE SODIUM SCH MG: 40 TABLET, DELAYED RELEASE ORAL at 09:44

## 2020-02-29 RX ADMIN — AMLODIPINE BESYLATE SCH MG: 2.5 TABLET ORAL at 09:00

## 2020-02-29 RX ADMIN — Medication SCH EACH: at 17:32

## 2020-02-29 RX ADMIN — GABAPENTIN SCH MG: 300 CAPSULE ORAL at 09:45

## 2020-02-29 RX ADMIN — FERROUS SULFATE TAB 325 MG (65 MG ELEMENTAL FE) SCH MG: 325 (65 FE) TAB at 17:33

## 2020-02-29 RX ADMIN — SIMVASTATIN SCH MG: 10 TABLET, FILM COATED ORAL at 21:23

## 2020-02-29 RX ADMIN — Medication SCH MG: at 09:44

## 2020-02-29 NOTE — NUR
RECHECK OF BLOOD SUGAR AND NOW 14O. PT. EATING DINNER.SEEMS MORE ALERT.CALL OUT TO DR. SU CLINTON WITH FINDINGS.

## 2020-02-29 NOTE — NUR
RETURN CALL FROM DR. CLINTON WITH ORDERS TO DC GLUCOTROL.CHARGE RN AWARE OF ALL INFO AND PT. 
STATUS.REVIEW OF ALL HYPOGLYCEMICS WITH DR. CLINTON.

## 2020-02-29 NOTE — NUR
RN CHECKING BGL AND FIND SUGAR IS 45.PT. AWAKE AND ALERT,SLIGHTLY SLUGGISH.IMMEDIATELY GIVEN 
4 OZ ORANGE JUICE WITH 3 PKTS. SUGAR.PT. IN DINING RM.BEING MONITORED CLOSELY.

## 2020-03-01 VITALS — DIASTOLIC BLOOD PRESSURE: 78 MMHG | SYSTOLIC BLOOD PRESSURE: 159 MMHG

## 2020-03-01 VITALS — SYSTOLIC BLOOD PRESSURE: 133 MMHG | DIASTOLIC BLOOD PRESSURE: 59 MMHG

## 2020-03-01 VITALS — SYSTOLIC BLOOD PRESSURE: 111 MMHG | DIASTOLIC BLOOD PRESSURE: 60 MMHG

## 2020-03-01 LAB — HEMOCCULT STL QL: NEGATIVE

## 2020-03-01 RX ADMIN — GABAPENTIN SCH MG: 300 CAPSULE ORAL at 16:12

## 2020-03-01 RX ADMIN — METFORMIN HYDROCHLORIDE SCH MG: 500 TABLET, FILM COATED ORAL at 16:12

## 2020-03-01 RX ADMIN — Medication SCH EACH: at 11:18

## 2020-03-01 RX ADMIN — FERROUS SULFATE TAB 325 MG (65 MG ELEMENTAL FE) SCH MG: 325 (65 FE) TAB at 08:21

## 2020-03-01 RX ADMIN — TEMAZEPAM PRN MG: 7.5 CAPSULE ORAL at 21:49

## 2020-03-01 RX ADMIN — INSULIN HUMAN PRN UNIT: 100 INJECTION, SOLUTION PARENTERAL at 11:19

## 2020-03-01 RX ADMIN — Medication SCH EACH: at 07:18

## 2020-03-01 RX ADMIN — GABAPENTIN SCH MG: 300 CAPSULE ORAL at 08:21

## 2020-03-01 RX ADMIN — Medication SCH MG: at 08:24

## 2020-03-01 RX ADMIN — Medication SCH EACH: at 21:49

## 2020-03-01 RX ADMIN — AMLODIPINE BESYLATE SCH MG: 2.5 TABLET ORAL at 08:23

## 2020-03-01 RX ADMIN — HUMAN INSULIN PRN UNIT: 100 INJECTION, SOLUTION SUBCUTANEOUS at 21:53

## 2020-03-01 RX ADMIN — Medication SCH MG: at 16:12

## 2020-03-01 RX ADMIN — LINAGLIPTIN SCH MG: 5 TABLET, FILM COATED ORAL at 08:21

## 2020-03-01 RX ADMIN — SIMVASTATIN SCH MG: 10 TABLET, FILM COATED ORAL at 21:49

## 2020-03-01 RX ADMIN — Medication SCH MG: at 08:22

## 2020-03-01 RX ADMIN — AMLODIPINE BESYLATE SCH MG: 2.5 TABLET ORAL at 16:12

## 2020-03-01 RX ADMIN — Medication SCH EACH: at 17:19

## 2020-03-01 RX ADMIN — PANTOPRAZOLE SODIUM SCH MG: 40 TABLET, DELAYED RELEASE ORAL at 08:21

## 2020-03-01 RX ADMIN — FERROUS SULFATE TAB 325 MG (65 MG ELEMENTAL FE) SCH MG: 325 (65 FE) TAB at 16:12

## 2020-03-01 RX ADMIN — METFORMIN HYDROCHLORIDE SCH MG: 500 TABLET, FILM COATED ORAL at 08:22

## 2020-03-01 RX ADMIN — INSULIN HUMAN PRN UNIT: 100 INJECTION, SOLUTION PARENTERAL at 08:02

## 2020-03-01 NOTE — NUR
GPS RN NOTE:

PATIENT AWAKE, AMBULATORY WITH WALKER ALERT AN ORIENTED X 2-3, CALM, COOPERATIVE, PACING, 
DENIES SI/HI, DENIES AH/VH, NO SOB, NO ACUTE DISTRESS, BREATHING EVEN AND 

UNLABORED, NO S/S OF PAIN AND DISCOMFORT, WILL CONTINUE TO MONITOR Q15 

MINS FOR SAFETY

## 2020-03-02 VITALS — SYSTOLIC BLOOD PRESSURE: 169 MMHG | DIASTOLIC BLOOD PRESSURE: 79 MMHG

## 2020-03-02 VITALS — SYSTOLIC BLOOD PRESSURE: 139 MMHG | DIASTOLIC BLOOD PRESSURE: 64 MMHG

## 2020-03-02 VITALS — DIASTOLIC BLOOD PRESSURE: 83 MMHG | SYSTOLIC BLOOD PRESSURE: 152 MMHG

## 2020-03-02 RX ADMIN — Medication SCH EACH: at 17:01

## 2020-03-02 RX ADMIN — INSULIN HUMAN PRN UNIT: 100 INJECTION, SOLUTION PARENTERAL at 07:27

## 2020-03-02 RX ADMIN — TEMAZEPAM PRN MG: 7.5 CAPSULE ORAL at 21:27

## 2020-03-02 RX ADMIN — INSULIN HUMAN PRN UNIT: 100 INJECTION, SOLUTION PARENTERAL at 12:01

## 2020-03-02 RX ADMIN — FERROUS SULFATE TAB 325 MG (65 MG ELEMENTAL FE) SCH MG: 325 (65 FE) TAB at 17:00

## 2020-03-02 RX ADMIN — SIMVASTATIN SCH MG: 10 TABLET, FILM COATED ORAL at 21:16

## 2020-03-02 RX ADMIN — AMLODIPINE BESYLATE SCH MG: 2.5 TABLET ORAL at 08:23

## 2020-03-02 RX ADMIN — METFORMIN HYDROCHLORIDE SCH MG: 500 TABLET, FILM COATED ORAL at 09:31

## 2020-03-02 RX ADMIN — Medication SCH MG: at 08:22

## 2020-03-02 RX ADMIN — HUMAN INSULIN PRN UNIT: 100 INJECTION, SOLUTION SUBCUTANEOUS at 21:14

## 2020-03-02 RX ADMIN — LINAGLIPTIN SCH MG: 5 TABLET, FILM COATED ORAL at 08:22

## 2020-03-02 RX ADMIN — Medication SCH EACH: at 07:26

## 2020-03-02 RX ADMIN — PANTOPRAZOLE SODIUM SCH MG: 40 TABLET, DELAYED RELEASE ORAL at 08:23

## 2020-03-02 RX ADMIN — METFORMIN HYDROCHLORIDE SCH MG: 500 TABLET, FILM COATED ORAL at 17:00

## 2020-03-02 RX ADMIN — Medication SCH EACH: at 11:57

## 2020-03-02 RX ADMIN — Medication SCH MG: at 17:00

## 2020-03-02 RX ADMIN — GABAPENTIN SCH MG: 300 CAPSULE ORAL at 17:00

## 2020-03-02 RX ADMIN — FERROUS SULFATE TAB 325 MG (65 MG ELEMENTAL FE) SCH MG: 325 (65 FE) TAB at 08:22

## 2020-03-02 RX ADMIN — AMLODIPINE BESYLATE SCH MG: 2.5 TABLET ORAL at 17:00

## 2020-03-02 RX ADMIN — Medication SCH EACH: at 21:16

## 2020-03-02 RX ADMIN — GABAPENTIN SCH MG: 300 CAPSULE ORAL at 08:22

## 2020-03-02 NOTE — NUR
Probable Cause (PC) Hearing: SW called the pts daughter, Manju ([phone]), and left a 
voicemail stating that the pt will be having a hearing and explained what that entails.

## 2020-03-02 NOTE — NUR
RN OPENING NOTES:



RECEIVED PT IN BED AND IS ASLEEP AT THIS TIME. WALKER NOTED AT BEDSIDE. BED KEPT IN LOW, 
LOCKED POSITION, AND SIDE RAILS X2UP. BED ALARM ACTIVATED AS WELL. NO SOB NOTED. NO S/S OF 
DISTRESS.

## 2020-03-02 NOTE — NUR
RN NOTES:



BLOOD SUGAR THIS PM . 4 UNITS OF INSULIN WAS ADMINISTERED. SNACK PROVIDED. PT ALSO 
REQUESTING FOR PILL TO SLEEP. WILL CONTINUE TO MONITOR.

## 2020-03-02 NOTE — NUR
SNF Referral: SW faxed a referral to [facility] with 
attn to Azeem to the fax number: [phone].

## 2020-03-02 NOTE — NUR
RN NOTES:



PT REQUESTED "SOMETHING FOR SLEEP." PT GIVEN RESTORIL 15MG PO AS ORDERED. WILL CONTINUE TO 
MONITOR. PT IN BED.

## 2020-03-03 VITALS — DIASTOLIC BLOOD PRESSURE: 73 MMHG | SYSTOLIC BLOOD PRESSURE: 127 MMHG

## 2020-03-03 VITALS — DIASTOLIC BLOOD PRESSURE: 65 MMHG | SYSTOLIC BLOOD PRESSURE: 124 MMHG

## 2020-03-03 VITALS — DIASTOLIC BLOOD PRESSURE: 73 MMHG | SYSTOLIC BLOOD PRESSURE: 147 MMHG

## 2020-03-03 VITALS — SYSTOLIC BLOOD PRESSURE: 127 MMHG | DIASTOLIC BLOOD PRESSURE: 73 MMHG

## 2020-03-03 VITALS — DIASTOLIC BLOOD PRESSURE: 70 MMHG | SYSTOLIC BLOOD PRESSURE: 150 MMHG

## 2020-03-03 RX ADMIN — AMLODIPINE BESYLATE SCH MG: 2.5 TABLET ORAL at 08:02

## 2020-03-03 RX ADMIN — Medication SCH EACH: at 07:25

## 2020-03-03 RX ADMIN — PANTOPRAZOLE SODIUM SCH MG: 40 TABLET, DELAYED RELEASE ORAL at 07:53

## 2020-03-03 RX ADMIN — AMLODIPINE BESYLATE SCH MG: 2.5 TABLET ORAL at 17:17

## 2020-03-03 RX ADMIN — HUMAN INSULIN PRN UNIT: 100 INJECTION, SOLUTION SUBCUTANEOUS at 21:10

## 2020-03-03 RX ADMIN — METFORMIN HYDROCHLORIDE SCH MG: 500 TABLET, FILM COATED ORAL at 08:00

## 2020-03-03 RX ADMIN — Medication SCH EACH: at 17:14

## 2020-03-03 RX ADMIN — LINAGLIPTIN SCH MG: 5 TABLET, FILM COATED ORAL at 08:02

## 2020-03-03 RX ADMIN — FERROUS SULFATE TAB 325 MG (65 MG ELEMENTAL FE) SCH MG: 325 (65 FE) TAB at 08:02

## 2020-03-03 RX ADMIN — GABAPENTIN SCH MG: 300 CAPSULE ORAL at 08:00

## 2020-03-03 RX ADMIN — Medication SCH MG: at 08:01

## 2020-03-03 RX ADMIN — Medication SCH MG: at 17:16

## 2020-03-03 RX ADMIN — TEMAZEPAM PRN MG: 7.5 CAPSULE ORAL at 21:45

## 2020-03-03 RX ADMIN — METFORMIN HYDROCHLORIDE SCH MG: 500 TABLET, FILM COATED ORAL at 17:19

## 2020-03-03 RX ADMIN — INSULIN HUMAN PRN UNIT: 100 INJECTION, SOLUTION PARENTERAL at 07:31

## 2020-03-03 RX ADMIN — Medication SCH MG: at 08:02

## 2020-03-03 RX ADMIN — INSULIN HUMAN PRN UNIT: 100 INJECTION, SOLUTION PARENTERAL at 12:06

## 2020-03-03 RX ADMIN — Medication SCH EACH: at 21:01

## 2020-03-03 RX ADMIN — FERROUS SULFATE TAB 325 MG (65 MG ELEMENTAL FE) SCH MG: 325 (65 FE) TAB at 17:17

## 2020-03-03 RX ADMIN — Medication SCH EACH: at 11:54

## 2020-03-03 RX ADMIN — GABAPENTIN SCH MG: 300 CAPSULE ORAL at 17:16

## 2020-03-03 RX ADMIN — SIMVASTATIN SCH MG: 10 TABLET, FILM COATED ORAL at 21:02

## 2020-03-03 NOTE — NUR
Group Note: SW encouraged pt to attend group therapy on 3/3/20 at 2pm discussing reality 
testing regarding their admission. Pt was asleep in his room and stated that he did not want 
to participate or talk to anyone at this time.

## 2020-03-03 NOTE — NUR
RN CLOSING NOTES:



ENDORSED TO AM NURSE FOR [place]. PT SITTING DOWN WITH WALKER NEAR TELEPHONE IN THE BACK. NO SOB 
NOTED. NO S/S OF DISTRESS.

## 2020-03-04 VITALS — DIASTOLIC BLOOD PRESSURE: 57 MMHG | SYSTOLIC BLOOD PRESSURE: 101 MMHG

## 2020-03-04 VITALS — SYSTOLIC BLOOD PRESSURE: 148 MMHG | DIASTOLIC BLOOD PRESSURE: 61 MMHG

## 2020-03-04 VITALS — DIASTOLIC BLOOD PRESSURE: 73 MMHG | SYSTOLIC BLOOD PRESSURE: 131 MMHG

## 2020-03-04 LAB
BASOPHILS # BLD AUTO: 0 /CMM (ref 0–0.2)
BASOPHILS NFR BLD AUTO: 0.6 % (ref 0–2)
BUN SERPL-MCNC: 11 MG/DL (ref 7–18)
CALCIUM SERPL-MCNC: 9.1 MG/DL (ref 8.5–10.1)
CHLORIDE SERPL-SCNC: 97 MMOL/L (ref 98–107)
CO2 SERPL-SCNC: 29 MMOL/L (ref 21–32)
CREAT SERPL-MCNC: 0.7 MG/DL (ref 0.6–1.3)
EOSINOPHIL NFR BLD AUTO: 4.3 % (ref 0–6)
GLUCOSE SERPL-MCNC: 219 MG/DL (ref 74–106)
HCT VFR BLD AUTO: 33 % (ref 39–51)
HGB BLD-MCNC: 10.3 G/DL (ref 13.5–17.5)
LYMPHOCYTES NFR BLD AUTO: 2.3 /CMM (ref 0.8–4.8)
LYMPHOCYTES NFR BLD AUTO: 26.4 % (ref 20–44)
MCHC RBC AUTO-ENTMCNC: 31 G/DL (ref 31–36)
MCV RBC AUTO: 69 FL (ref 80–96)
MONOCYTES NFR BLD AUTO: 0.6 /CMM (ref 0.1–1.3)
MONOCYTES NFR BLD AUTO: 6.5 % (ref 2–12)
NEUTROPHILS # BLD AUTO: 5.4 /CMM (ref 1.8–8.9)
NEUTROPHILS NFR BLD AUTO: 62.2 % (ref 43–81)
PLATELET # BLD AUTO: 396 /CMM (ref 150–450)
POTASSIUM SERPL-SCNC: 4.6 MMOL/L (ref 3.5–5.1)
RBC # BLD AUTO: 4.78 MIL/UL (ref 4.5–6)
SODIUM SERPL-SCNC: 134 MMOL/L (ref 136–145)
WBC NRBC COR # BLD AUTO: 8.7 K/UL (ref 4.3–11)

## 2020-03-04 RX ADMIN — METFORMIN HYDROCHLORIDE SCH MG: 500 TABLET, FILM COATED ORAL at 08:08

## 2020-03-04 RX ADMIN — AMLODIPINE BESYLATE SCH MG: 2.5 TABLET ORAL at 08:08

## 2020-03-04 RX ADMIN — AMLODIPINE BESYLATE SCH MG: 2.5 TABLET ORAL at 16:31

## 2020-03-04 RX ADMIN — Medication SCH EACH: at 12:03

## 2020-03-04 RX ADMIN — Medication SCH EACH: at 16:31

## 2020-03-04 RX ADMIN — HUMAN INSULIN PRN UNIT: 100 INJECTION, SOLUTION SUBCUTANEOUS at 22:22

## 2020-03-04 RX ADMIN — TEMAZEPAM PRN MG: 7.5 CAPSULE ORAL at 23:23

## 2020-03-04 RX ADMIN — INSULIN HUMAN PRN UNIT: 100 INJECTION, SOLUTION PARENTERAL at 08:10

## 2020-03-04 RX ADMIN — PANTOPRAZOLE SODIUM SCH MG: 40 TABLET, DELAYED RELEASE ORAL at 06:50

## 2020-03-04 RX ADMIN — INSULIN HUMAN PRN UNIT: 100 INJECTION, SOLUTION PARENTERAL at 12:33

## 2020-03-04 RX ADMIN — GABAPENTIN SCH MG: 300 CAPSULE ORAL at 08:07

## 2020-03-04 RX ADMIN — FERROUS SULFATE TAB 325 MG (65 MG ELEMENTAL FE) SCH MG: 325 (65 FE) TAB at 16:31

## 2020-03-04 RX ADMIN — LINAGLIPTIN SCH MG: 5 TABLET, FILM COATED ORAL at 08:07

## 2020-03-04 RX ADMIN — METFORMIN HYDROCHLORIDE SCH MG: 500 TABLET, FILM COATED ORAL at 16:31

## 2020-03-04 RX ADMIN — GABAPENTIN SCH MG: 300 CAPSULE ORAL at 16:31

## 2020-03-04 RX ADMIN — INSULIN HUMAN PRN UNIT: 100 INJECTION, SOLUTION PARENTERAL at 16:38

## 2020-03-04 RX ADMIN — Medication SCH MG: at 08:08

## 2020-03-04 RX ADMIN — FERROUS SULFATE TAB 325 MG (65 MG ELEMENTAL FE) SCH MG: 325 (65 FE) TAB at 08:08

## 2020-03-04 RX ADMIN — Medication SCH EACH: at 21:18

## 2020-03-04 RX ADMIN — SIMVASTATIN SCH MG: 10 TABLET, FILM COATED ORAL at 21:14

## 2020-03-04 RX ADMIN — Medication SCH EACH: at 08:06

## 2020-03-04 RX ADMIN — Medication SCH MG: at 16:30

## 2020-03-04 NOTE — NUR
Group Note: SW encouraged pt to attend group therapy on 3/4/20 at 2pm discussing discharge 
planning. Pt was present in the activities room and stated that he is going to be discharged 
the following day to [facility] and that he was excited about going 
back home soon. Pt then became distracted by the other patients and was unable to focus on 
the SW.

## 2020-03-04 NOTE — NUR
GPS RN NOTES: C/O OF INSOMNIA 

PT C/O UNABLE TO SLEEP. PT STATED, " NO SLEEP. PILL PLEASE." OFFERED RESTORIL 15 MG PO PRN 
AS ORDERED. PT AGREED AND TOLERATED MEDICATION WELL. CONTINUE TO MONITOR.

## 2020-03-04 NOTE — NUR
Family Contact: SW called the pts daughter, Manju ([phone]), and left a voicemail 
stating that the pt will be discharged to [facility] 
tomorrow.

## 2020-03-05 VITALS — DIASTOLIC BLOOD PRESSURE: 69 MMHG | SYSTOLIC BLOOD PRESSURE: 143 MMHG

## 2020-03-05 VITALS — SYSTOLIC BLOOD PRESSURE: 143 MMHG | DIASTOLIC BLOOD PRESSURE: 69 MMHG

## 2020-03-05 RX ADMIN — Medication SCH EACH: at 07:40

## 2020-03-05 RX ADMIN — LINAGLIPTIN SCH MG: 5 TABLET, FILM COATED ORAL at 08:09

## 2020-03-05 RX ADMIN — GABAPENTIN SCH MG: 300 CAPSULE ORAL at 08:09

## 2020-03-05 RX ADMIN — INSULIN HUMAN PRN UNIT: 100 INJECTION, SOLUTION PARENTERAL at 08:29

## 2020-03-05 RX ADMIN — PANTOPRAZOLE SODIUM SCH MG: 40 TABLET, DELAYED RELEASE ORAL at 07:56

## 2020-03-05 RX ADMIN — AMLODIPINE BESYLATE SCH MG: 2.5 TABLET ORAL at 08:10

## 2020-03-05 RX ADMIN — Medication SCH MG: at 08:10

## 2020-03-05 RX ADMIN — INSULIN HUMAN PRN UNIT: 100 INJECTION, SOLUTION PARENTERAL at 12:04

## 2020-03-05 RX ADMIN — METFORMIN HYDROCHLORIDE SCH MG: 500 TABLET, FILM COATED ORAL at 08:09

## 2020-03-05 RX ADMIN — Medication SCH EACH: at 11:56

## 2020-03-05 RX ADMIN — FERROUS SULFATE TAB 325 MG (65 MG ELEMENTAL FE) SCH MG: 325 (65 FE) TAB at 08:09

## 2020-03-05 RX ADMIN — Medication SCH MG: at 08:09

## 2020-03-05 NOTE — NUR
GPS/RN-NOTES

[facility]  AND DR. CLINTON MADE AWARE AND 
AGREED OF THE DISCHARGE WITH ORDERS. PATIENT DID NOT VERBALIZE SI/HI,DENIES VISUAL AUDITORY 
HALLUCINATIONS AT THE TIME OF DISCHARGE. ALL DISCHARGE PAPERS WAS SIGN BY THE PATIENT. 
REPORT WAS GIVEN TO [facility].  BY AMBULANCE VIA GURNEY WITH TWO 
STAFF ASSIST.

## 2020-03-05 NOTE — NUR
Discharge Note: Pt was discharged to [facility] 
(SNF) located at [address], [place] [address], [phone]. Pt was 
transported via [facility] at 3PM. SW called the pts daughter, Manju ([phone]), and 
left a voicemail and informed her of the discharge. Upon discharge, the pt appeared to be in 
a euthymic mood and presented with a calm affect. Pt appeared to be oriented x4 (time, self, 
place and situation). Pt denied both suicidal and homicidal ideation as well as auditory and 
visual hallucinations. Pt will follow up with psychiatrist, Dr. Ortiz, located at [address] [place] [address]; [phone] and internist, Dr. Ramirez, located at 
[address] [place] [address]; [phone].

## 2023-02-12 ENCOUNTER — HOSPITAL ENCOUNTER (INPATIENT)
Dept: HOSPITAL 54 - TELE | Age: 78
LOS: 6 days | Discharge: NURSING, REHAB OR LONG-TERM CARE | DRG: 385 | End: 2023-02-18
Attending: INTERNAL MEDICINE | Admitting: INTERNAL MEDICINE
Payer: MEDICARE

## 2023-02-12 VITALS — WEIGHT: 138.25 LBS | HEIGHT: 60 IN | BODY MASS INDEX: 27.14 KG/M2

## 2023-02-12 VITALS — SYSTOLIC BLOOD PRESSURE: 143 MMHG | DIASTOLIC BLOOD PRESSURE: 77 MMHG

## 2023-02-12 DIAGNOSIS — F32.A: ICD-10-CM

## 2023-02-12 DIAGNOSIS — K51.911: Primary | ICD-10-CM

## 2023-02-12 DIAGNOSIS — N40.1: ICD-10-CM

## 2023-02-12 DIAGNOSIS — E44.1: ICD-10-CM

## 2023-02-12 DIAGNOSIS — R33.8: ICD-10-CM

## 2023-02-12 DIAGNOSIS — E11.65: ICD-10-CM

## 2023-02-12 DIAGNOSIS — G93.41: ICD-10-CM

## 2023-02-12 DIAGNOSIS — M19.90: ICD-10-CM

## 2023-02-12 DIAGNOSIS — E11.649: ICD-10-CM

## 2023-02-12 DIAGNOSIS — Z79.82: ICD-10-CM

## 2023-02-12 DIAGNOSIS — F20.9: ICD-10-CM

## 2023-02-12 DIAGNOSIS — T38.0X5A: ICD-10-CM

## 2023-02-12 DIAGNOSIS — J44.9: ICD-10-CM

## 2023-02-12 DIAGNOSIS — Z20.822: ICD-10-CM
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
## 2023-02-12 DIAGNOSIS — Z79.84: ICD-10-CM

## 2023-02-12 DIAGNOSIS — Z79.899: ICD-10-CM

## 2023-02-12 DIAGNOSIS — F03.93: ICD-10-CM

## 2023-02-12 DIAGNOSIS — K64.9: ICD-10-CM

## 2023-02-12 DIAGNOSIS — D75.839: ICD-10-CM

## 2023-02-12 DIAGNOSIS — Z79.4: ICD-10-CM

## 2023-02-12 DIAGNOSIS — D50.0: ICD-10-CM

## 2023-02-12 DIAGNOSIS — D50.9: ICD-10-CM

## 2023-02-12 DIAGNOSIS — G47.00: ICD-10-CM

## 2023-02-12 DIAGNOSIS — E78.5: ICD-10-CM

## 2023-02-12 DIAGNOSIS — D72.829: ICD-10-CM

## 2023-02-12 DIAGNOSIS — Z87.891: ICD-10-CM

## 2023-02-12 DIAGNOSIS — E11.40: ICD-10-CM

## 2023-02-12 DIAGNOSIS — Z91.14: ICD-10-CM

## 2023-02-12 PROCEDURE — A4216 STERILE WATER/SALINE, 10 ML: HCPCS

## 2023-02-12 PROCEDURE — G0378 HOSPITAL OBSERVATION PER HR: HCPCS

## 2023-02-12 PROCEDURE — A4223 INFUSION SUPPLIES W/O PUMP: HCPCS

## 2023-02-12 RX ADMIN — OLANZAPINE SCH MG: 10 TABLET ORAL at 22:27

## 2023-02-12 RX ADMIN — TAMSULOSIN HYDROCHLORIDE SCH MG: 0.4 CAPSULE ORAL at 22:27

## 2023-02-12 RX ADMIN — MIRTAZAPINE SCH MG: 15 TABLET, FILM COATED ORAL at 22:27

## 2023-02-12 RX ADMIN — DICYCLOMINE HYDROCHLORIDE SCH MG: 10 CAPSULE ORAL at 23:54

## 2023-02-12 RX ADMIN — Medication SCH EACH: at 22:00

## 2023-02-12 RX ADMIN — ACETAMINOPHEN PRN MG: 325 TABLET ORAL at 23:54

## 2023-02-12 NOTE — NUR
tele lvn initial notes

 Got a  direct admit from [facility]. A male pt Malian speaking vero with simple 
English , confused, poor historian, and hard off hearing. DX of Colitis, PNA, and hypo 
glycemia. Oriented where at and how to used the call light system. no N/V noted. patient had 
IVF D%NS infusing at this time on his left AC patent and intact. complaining of abdominal 
pain and feels urgency to have bowel movement , noticed having small amount of liquid brown 
color came out with urine also at the same time.Noticed multiple bruise on his right upper 
arm Skin warm and dry to touch. Not in any acute distress noted. kept him warm and 
comfortable at all times. cleaned him and put him back to bed. Bed in low and lock in 
position with side rails x3 applied with bed alarm on for pt comfort and safety. Tele 
applied to his chest wall Sinus Rhythm heart rate 79 per monitor[address] place call light at reach. 15 y/o female with no significant PMHx, not on any blood thinners, presents to ED s/p head injury. Patient reports she was in the gym in the school earlier today when she tripped and fell, hitting the back of her head against the floor with no LOC. Since then, she reports mild HA, dizziness, and fatigue. Also reports mild nausea that has since improved. Denies any vision changes, photophobia, vomiting, vision changes, neck pain, numbness, tingling, or weakness. Patient has not taken any meds PTA. 15 y/o female with no significant PMHx, not on any blood thinners, presents to ED with father s/p head injury. Patient reports she was in the gym in the school earlier today when she tripped and fell, hitting the back of her head against the floor with no LOC. Since then, she reports mild HA, dizziness, and fatigue. Also reports mild nausea that has since improved. Denies any vision changes, photophobia, vomiting, vision changes, neck pain, numbness, tingling, or weakness. Patient has not taken any meds PTA.

## 2023-02-12 NOTE — NUR
tele lvn notes

 blood sugar checked 178, pt refused insulin . no signs of hypo glycemia noted at this time. 
will continue monitoring.

## 2023-02-12 NOTE — NUR
telel lvn notes

 Zosyn IVPB hung by nurse Heriberto/RN , as ordered. patient so confused needs re-orientation,

## 2023-02-13 VITALS — SYSTOLIC BLOOD PRESSURE: 135 MMHG | DIASTOLIC BLOOD PRESSURE: 70 MMHG

## 2023-02-13 VITALS — DIASTOLIC BLOOD PRESSURE: 73 MMHG | SYSTOLIC BLOOD PRESSURE: 156 MMHG

## 2023-02-13 VITALS — DIASTOLIC BLOOD PRESSURE: 60 MMHG | SYSTOLIC BLOOD PRESSURE: 142 MMHG

## 2023-02-13 VITALS — SYSTOLIC BLOOD PRESSURE: 133 MMHG | DIASTOLIC BLOOD PRESSURE: 76 MMHG

## 2023-02-13 VITALS — DIASTOLIC BLOOD PRESSURE: 46 MMHG | SYSTOLIC BLOOD PRESSURE: 110 MMHG

## 2023-02-13 VITALS — DIASTOLIC BLOOD PRESSURE: 58 MMHG | SYSTOLIC BLOOD PRESSURE: 183 MMHG

## 2023-02-13 LAB
ALBUMIN SERPL BCP-MCNC: 1.9 G/DL (ref 3.4–5)
ALP SERPL-CCNC: 105 U/L (ref 46–116)
ALT SERPL W P-5'-P-CCNC: 19 U/L (ref 12–78)
AST SERPL W P-5'-P-CCNC: 31 U/L (ref 15–37)
BASOPHILS # BLD AUTO: 0 K/UL (ref 0–0.2)
BASOPHILS NFR BLD AUTO: 0 % (ref 0–2)
BILIRUB SERPL-MCNC: 0.3 MG/DL (ref 0.2–1)
BUN SERPL-MCNC: 5 MG/DL (ref 7–18)
CALCIUM SERPL-MCNC: 8.2 MG/DL (ref 8.5–10.1)
CHLORIDE SERPL-SCNC: 102 MMOL/L (ref 98–107)
CHOLEST SERPL-MCNC: 97 MG/DL (ref ?–200)
CO2 SERPL-SCNC: 27 MMOL/L (ref 21–32)
CREAT SERPL-MCNC: 0.8 MG/DL (ref 0.6–1.3)
EOSINOPHIL NFR BLD AUTO: 0 % (ref 0–6)
GLUCOSE SERPL-MCNC: 68 MG/DL (ref 74–106)
HCT VFR BLD AUTO: 27 % (ref 39–51)
HDLC SERPL-MCNC: 35 MG/DL (ref 40–60)
HEMOCCULT STL QL: POSITIVE
HGB BLD-MCNC: 8.8 G/DL (ref 13.5–17.5)
LDLC SERPL DIRECT ASSAY-MCNC: 50 MG/DL (ref 0–99)
LYMPHOCYTES NFR BLD AUTO: 0.9 K/UL (ref 0.8–4.8)
LYMPHOCYTES NFR BLD AUTO: 12.3 % (ref 20–44)
MAGNESIUM SERPL-MCNC: 1.5 MG/DL (ref 1.8–2.4)
MCHC RBC AUTO-ENTMCNC: 33 G/DL (ref 31–36)
MCV RBC AUTO: 81 FL (ref 80–96)
MONOCYTES NFR BLD AUTO: 0.3 K/UL (ref 0.1–1.3)
MONOCYTES NFR BLD AUTO: 4.1 % (ref 2–12)
NEUTROPHILS # BLD AUTO: 6 K/UL (ref 1.8–8.9)
NEUTROPHILS NFR BLD AUTO: 83.6 % (ref 43–81)
PHOSPHATE SERPL-MCNC: 3.5 MG/DL (ref 2.5–4.9)
PLATELET # BLD AUTO: 502 K/UL (ref 150–450)
POTASSIUM SERPL-SCNC: 3.6 MMOL/L (ref 3.5–5.1)
PROT SERPL-MCNC: 6.4 G/DL (ref 6.4–8.2)
RBC # BLD AUTO: 3.34 MIL/UL (ref 4.5–6)
SODIUM SERPL-SCNC: 136 MMOL/L (ref 136–145)
TRIGL SERPL-MCNC: 47 MG/DL (ref 30–150)
TSH SERPL DL<=0.005 MIU/L-ACNC: 0.83 UIU/ML (ref 0.36–3.74)
WBC NRBC COR # BLD AUTO: 7.2 K/UL (ref 4.3–11)

## 2023-02-13 RX ADMIN — Medication SCH EACH: at 22:00

## 2023-02-13 RX ADMIN — Medication SCH MLS/HR: at 11:53

## 2023-02-13 RX ADMIN — Medication SCH MG: at 09:00

## 2023-02-13 RX ADMIN — DEXTROSE MONOHYDRATE SCH MLS/HR: 50 INJECTION, SOLUTION INTRAVENOUS at 00:20

## 2023-02-13 RX ADMIN — Medication SCH EACH: at 08:34

## 2023-02-13 RX ADMIN — DEXTROSE MONOHYDRATE SCH MLS/HR: 50 INJECTION, SOLUTION INTRAVENOUS at 18:04

## 2023-02-13 RX ADMIN — SODIUM CHLORIDE SCH MLS/HR: 9 INJECTION, SOLUTION INTRAVENOUS at 15:51

## 2023-02-13 RX ADMIN — ACETAMINOPHEN PRN MG: 325 TABLET ORAL at 10:43

## 2023-02-13 RX ADMIN — Medication SCH EACH: at 18:03

## 2023-02-13 RX ADMIN — Medication SCH MG: at 17:00

## 2023-02-13 RX ADMIN — MAGNESIUM SULFATE IN DEXTROSE SCH MLS/HR: 10 INJECTION, SOLUTION INTRAVENOUS at 14:53

## 2023-02-13 RX ADMIN — OLANZAPINE SCH MG: 10 TABLET ORAL at 23:20

## 2023-02-13 RX ADMIN — Medication SCH EACH: at 12:49

## 2023-02-13 RX ADMIN — PANTOPRAZOLE SODIUM SCH MG: 40 TABLET, DELAYED RELEASE ORAL at 10:10

## 2023-02-13 RX ADMIN — DICYCLOMINE HYDROCHLORIDE SCH MG: 10 CAPSULE ORAL at 12:55

## 2023-02-13 RX ADMIN — DICYCLOMINE HYDROCHLORIDE SCH MG: 10 CAPSULE ORAL at 23:22

## 2023-02-13 RX ADMIN — INSULIN HUMAN PRN UNITS: 100 INJECTION, SOLUTION PARENTERAL at 18:12

## 2023-02-13 RX ADMIN — DICYCLOMINE HYDROCHLORIDE SCH MG: 10 CAPSULE ORAL at 18:03

## 2023-02-13 RX ADMIN — Medication SCH MG: at 12:55

## 2023-02-13 RX ADMIN — Medication SCH MG: at 18:04

## 2023-02-13 RX ADMIN — TAMSULOSIN HYDROCHLORIDE SCH MG: 0.4 CAPSULE ORAL at 23:21

## 2023-02-13 RX ADMIN — Medication SCH MG: at 10:09

## 2023-02-13 RX ADMIN — DICYCLOMINE HYDROCHLORIDE SCH MG: 10 CAPSULE ORAL at 06:26

## 2023-02-13 RX ADMIN — MAGNESIUM SULFATE IN DEXTROSE SCH MLS/HR: 10 INJECTION, SOLUTION INTRAVENOUS at 13:44

## 2023-02-13 RX ADMIN — Medication SCH MLS/HR: at 18:53

## 2023-02-13 RX ADMIN — MIRTAZAPINE SCH MG: 15 TABLET, FILM COATED ORAL at 23:20

## 2023-02-13 RX ADMIN — Medication SCH MG: at 10:10

## 2023-02-13 NOTE — NUR
TELE RN NOTE



ABLE TO COLLECT STOOL- MUCOID IN CHARACTER, NOTED 1-2 DROPS OF BLOOD. DR. CLINTON NOTIFIED OF 
VISUAL FINDINGS.

## 2023-02-13 NOTE — NUR
TELE RN OPENING NOTES



RECEIVED PATIENT IN BED AWAKE AT THIS TIME. A/O X2-3. ON ROOM AIR, WITH EQUAL AND UNLABORED 
BREATHING, NO SIGNS OF RESPIRATORY DISTRESS. WITH IV ACCESS ON THE RIGHT WRIST G24, ON 
SALINE LOCK, PATENT AND INTACT. SAFETY MEASURES IN PLACE: BED LOCKED AND IN LOWEST POSITION, 
SIDE RAILS UP X2, BED ALARM ON, CALL LIGHT AND TRAY TABLE WITHIN REACH. WILL CONTINUE WITH 
PLAN OF CARE.

## 2023-02-13 NOTE — NUR
TELE RN NOTE



BS CHECKED, LATEST BS 262MG/DL, REFUSED INSULIN AT THIS TIME. HEALTH TEACHING DONE, 
VERBALIZED UNDERSTANDING. IN STABLE CONDITION.

## 2023-02-13 NOTE — NUR
TELE RN OPENING NOTE



RECEIVED PT IN BED AWAKE AT THIS TIME. A/O X2, COHERENT, ABLE TO MAKE NEEDS KNOWN. ON ROOM 
AIR, WITH EQUAL AND UNLABORED BREATHING, NO SIGNS OF RESPIRATORY DISTRESS. WITH IV ACCESS ON 
THE LEFT AC G22, ON SALINE LOCK, PATENT AND INTACT. SAFETY MEASURES IN PLACE: BED LOCKED AND 
IN LOWEST POSITION, SIDE RAILS UP X2, BED ALARM ON, CALL LIGHT AND TRAY TABLE WITHIN REACH. 
WILL CONTINUE WITH PLAN OF CARE.

## 2023-02-13 NOTE — NUR
tele lvn notes

 pt so confused, getting out of bed even we told him to stay in bed for his safety. no signs 
of any discomfort noted. frequent re-orientation needed. Zithromax IVP Bag hung by another 
nurse as ordered. educate him why he needs antibiotic as well. will continue monitoring. Bed 
in low and lock in position and side x2 up for pt safety

## 2023-02-13 NOTE — NUR
TELE RN NOTE



BS CHECKED AND WAS 33MG/DL. PATIENT IS ALERT AND COHERENT. ASYMPTOMATIC OF HYPOGLYCEMIA. 
JUICE GIVEN AND D50 GIVEN PER PROTOCOL. BS RECHECKED AND WAS 196MG/DL. WILL CONTINUE WITH 
PLAN OF CARE.

## 2023-02-13 NOTE — NUR
TELE RN CLOSING NOTE



PT IN BED AWAKE AT THIS TIME. A/O X2-3, COHERENT, ABLE TO MAKE NEEDS KNOWN. ON ROOM AIR, 
WITH EQUAL AND UNLABORED BREATHING, NO SIGNS OF RESPIRATORY DISTRESS. WITH IV ACCESS ON THE 
RIGHT WRIST G24, ON SALINE LOCK, PATENT AND INTACT. SAFETY MEASURES IN PLACE: BED LOCKED AND 
IN LOWEST POSITION, SIDE RAILS UP X2, BED ALARM ON, CALL LIGHT AND TRAY TABLE WITHIN REACH. 
WILL CONTINUE WITH PLAN OF CARE.

## 2023-02-13 NOTE — NUR
tele lvn closing notes

 pt resting now after back and fort,to the restroom even you told him that he needs to used 
the bedside commode because he's unsteady and not even aware where he  at . will continue 
monitoring. place call light at reach.

## 2023-02-14 VITALS — DIASTOLIC BLOOD PRESSURE: 77 MMHG | SYSTOLIC BLOOD PRESSURE: 147 MMHG

## 2023-02-14 VITALS — DIASTOLIC BLOOD PRESSURE: 74 MMHG | SYSTOLIC BLOOD PRESSURE: 141 MMHG

## 2023-02-14 VITALS — DIASTOLIC BLOOD PRESSURE: 74 MMHG | SYSTOLIC BLOOD PRESSURE: 135 MMHG

## 2023-02-14 VITALS — SYSTOLIC BLOOD PRESSURE: 154 MMHG | DIASTOLIC BLOOD PRESSURE: 64 MMHG

## 2023-02-14 LAB
ALBUMIN SERPL BCP-MCNC: 2.2 G/DL (ref 3.4–5)
ALP SERPL-CCNC: 113 U/L (ref 46–116)
ALT SERPL W P-5'-P-CCNC: 21 U/L (ref 12–78)
AST SERPL W P-5'-P-CCNC: 31 U/L (ref 15–37)
BASOPHILS # BLD AUTO: 0 K/UL (ref 0–0.2)
BASOPHILS NFR BLD AUTO: 0.1 % (ref 0–2)
BILIRUB SERPL-MCNC: 0.2 MG/DL (ref 0.2–1)
BUN SERPL-MCNC: 6 MG/DL (ref 7–18)
CALCIUM SERPL-MCNC: 8.4 MG/DL (ref 8.5–10.1)
CHLORIDE SERPL-SCNC: 101 MMOL/L (ref 98–107)
CO2 SERPL-SCNC: 24 MMOL/L (ref 21–32)
CREAT SERPL-MCNC: 1 MG/DL (ref 0.6–1.3)
EOSINOPHIL NFR BLD AUTO: 0.1 % (ref 0–6)
GLUCOSE SERPL-MCNC: 170 MG/DL (ref 74–106)
HCT VFR BLD AUTO: 33 % (ref 39–51)
HGB BLD-MCNC: 10.5 G/DL (ref 13.5–17.5)
LYMPHOCYTES NFR BLD AUTO: 1.2 K/UL (ref 0.8–4.8)
LYMPHOCYTES NFR BLD AUTO: 16.7 % (ref 20–44)
MCHC RBC AUTO-ENTMCNC: 32 G/DL (ref 31–36)
MCV RBC AUTO: 82 FL (ref 80–96)
MONOCYTES NFR BLD AUTO: 0.8 K/UL (ref 0.1–1.3)
MONOCYTES NFR BLD AUTO: 10.3 % (ref 2–12)
NEUTROPHILS # BLD AUTO: 5.4 K/UL (ref 1.8–8.9)
NEUTROPHILS NFR BLD AUTO: 72.8 % (ref 43–81)
PLATELET # BLD AUTO: 623 K/UL (ref 150–450)
POTASSIUM SERPL-SCNC: 3.8 MMOL/L (ref 3.5–5.1)
PROT SERPL-MCNC: 6.9 G/DL (ref 6.4–8.2)
RBC # BLD AUTO: 3.97 MIL/UL (ref 4.5–6)
SODIUM SERPL-SCNC: 137 MMOL/L (ref 136–145)
WBC NRBC COR # BLD AUTO: 7.5 K/UL (ref 4.3–11)

## 2023-02-14 RX ADMIN — Medication SCH EACH: at 17:23

## 2023-02-14 RX ADMIN — Medication SCH EACH: at 07:01

## 2023-02-14 RX ADMIN — DICYCLOMINE HYDROCHLORIDE SCH MG: 10 CAPSULE ORAL at 17:11

## 2023-02-14 RX ADMIN — DEXTROSE MONOHYDRATE SCH MLS/HR: 50 INJECTION, SOLUTION INTRAVENOUS at 00:00

## 2023-02-14 RX ADMIN — DEXTROSE MONOHYDRATE SCH MLS/HR: 50 INJECTION, SOLUTION INTRAVENOUS at 14:30

## 2023-02-14 RX ADMIN — Medication SCH EACH: at 12:20

## 2023-02-14 RX ADMIN — INSULIN HUMAN PRN UNITS: 100 INJECTION, SOLUTION PARENTERAL at 12:19

## 2023-02-14 RX ADMIN — Medication SCH MG: at 09:32

## 2023-02-14 RX ADMIN — SODIUM CHLORIDE SCH MLS/HR: 9 INJECTION, SOLUTION INTRAVENOUS at 16:55

## 2023-02-14 RX ADMIN — Medication SCH MLS/HR: at 15:48

## 2023-02-14 RX ADMIN — Medication SCH MG: at 12:17

## 2023-02-14 RX ADMIN — PANTOPRAZOLE SODIUM SCH MG: 40 TABLET, DELAYED RELEASE ORAL at 09:32

## 2023-02-14 RX ADMIN — Medication SCH MG: at 09:30

## 2023-02-14 RX ADMIN — Medication SCH MG: at 09:00

## 2023-02-14 RX ADMIN — Medication SCH EACH: at 22:00

## 2023-02-14 RX ADMIN — Medication SCH MG: at 17:11

## 2023-02-14 RX ADMIN — DICYCLOMINE HYDROCHLORIDE SCH MG: 10 CAPSULE ORAL at 12:17

## 2023-02-14 RX ADMIN — HUMAN INSULIN PRN UNIT: 100 INJECTION, SOLUTION SUBCUTANEOUS at 23:53

## 2023-02-14 RX ADMIN — DICYCLOMINE HYDROCHLORIDE SCH MG: 10 CAPSULE ORAL at 06:00

## 2023-02-14 RX ADMIN — Medication SCH MLS/HR: at 09:47

## 2023-02-14 RX ADMIN — Medication SCH MG: at 17:00

## 2023-02-14 RX ADMIN — Medication SCH MLS/HR: at 01:48

## 2023-02-14 NOTE — NUR
RN NOTES



PATIENT'S BS LEVEL . GAVE 10 UNITS OF REGULAR INSULIN. WILL NOTIFY THE DOCTOR. WILL 
RECHECK THE SUGAR LEVEL AND CONTINUE TO MONITOR.

## 2023-02-14 NOTE — NUR
RN NOTES



PATIENT GOING IN AND OUT OF THE BED. PATIENT IS RESTLESS AND TRYING TO PULL OUT HIS IV 
ACCESS. ORDERED ACUTE MEDICAL RESTRAINT. WILL CONTINUE TO MONITOR PATIENT.

## 2023-02-14 NOTE — NUR
TELE RN CLOSING NOTES



PATIENT IN BED AWAKE AT THIS TIME. A/O X2-3. PATIENT WAS COMBATIVE DURING THE SHIFT. ON ROOM 
AIR, WITH EQUAL AND UNLABORED BREATHING, NO SIGNS OF RESPIRATORY DISTRESS. NO IV ACCESS, 
PATIENT KEEP ON REMOVING IV. NO TELE MONITOR, PATIENT KEEPS REMOVING THE MONITOR TOO. SAFETY 
MEASURES IN PLACE: BED LOCKED AND IN LOWEST POSITION, SIDE RAILS UP X2, BED ALARM ON, CALL 
LIGHT AND TRAY TABLE WITHIN REACH. WILL ENDORSE TO THE NEXT SHIFT.

## 2023-02-14 NOTE — NUR
TELE RN OPENING NOTES



RECEIVED PATIENT IN BED AWAKE AT THIS TIME. A/O X 2-3. ON ROOM AIR, WITH EQUAL AND UNLABORED 
BREATHING, NO SIGNS OF RESPIRATORY DISTRESS. IV ACCESS ON THE RIGHT FOREARM #22G WITH D10 NS 
154 MEQ @ 40ML/HR. SAFETY MEASURES IN PLACE: BED LOCKED AND IN LOWEST POSITION, SIDE RAILS 
UP X2, BED ALARM ON, CALL LIGHT AND TRAY TABLE WITHIN REACH. WILL CONTINUE WITH THE PLAN OF 
CARE.

## 2023-02-14 NOTE — NUR
RN NOTES



PATIENT'S BS LEVEL IS 20. GAVE APPLE AND ORANGE JUICE WITH SUGAR. NO IV ACCESS. WILL 
CONTINUE TO MONITOR.

## 2023-02-14 NOTE — NUR
JENNI NOTES



AZITHROMYCIN IV NOT GIVEN DUE TO ABSENCE OF IV ACCESS. INFORMED DR. SORENSEN. WILL CONTINUE 
TO MONITOR PATIENT. 

-------------------------------------------------------------------------------

Addendum: 02/14/23 at 0431 by SHAYY CORRIGAN RN

-------------------------------------------------------------------------------

TIME IS 0230

## 2023-02-14 NOTE — NUR
RN NOTES



PATIENT IS RESTLESS. WENT OUT OF THE ROOM AND VERBALIZED "HE'S GOING TO THE LINE IN THE 
IMMIGRATION". MORPHINE IV GIVEN PRN. WILL CONTINUE TO MONITOR PATIENT.

## 2023-02-14 NOTE — NUR
RN TELE NOTES

PT IN BED, AWAKE, ALERT AND ORIENTED, DENIES PAIN, NOT IN DISTRESS, CALL LIGHT WITHIN REACH, 
BED ALARM ON AT ALL TIMES, IV FLUIDS INFUSING, PT WITH EPISODES OF DIARRHEA, STOOL SAMPLE 
SENT TO LAB FOR STOOL STUDIES, AMBULATES WITH SLOW AND STEADY GAIT, TOLERATES CURRENT DIET, 
ALL NEEDS ATTENDED.

## 2023-02-14 NOTE — NUR
RN NOTES



ICU NURSE INSERTED EJ TO THE PATIENT. TRIED TO INSERT PERIPHERAL IV BUT FAILED. INFORMED DR. SORENSNE.

## 2023-02-14 NOTE — NUR
RN TELE NOTES

PT SEEN AND EXAMINED BY DR. CLINTON, PLAN OF CARE DISCUSSED WITH PT, PER DR. CLINTON, OK FOR 
MIDLINE IF NEEDED.

## 2023-02-15 VITALS — SYSTOLIC BLOOD PRESSURE: 120 MMHG | DIASTOLIC BLOOD PRESSURE: 59 MMHG

## 2023-02-15 VITALS — DIASTOLIC BLOOD PRESSURE: 65 MMHG | SYSTOLIC BLOOD PRESSURE: 143 MMHG

## 2023-02-15 VITALS — DIASTOLIC BLOOD PRESSURE: 73 MMHG | SYSTOLIC BLOOD PRESSURE: 155 MMHG

## 2023-02-15 VITALS — SYSTOLIC BLOOD PRESSURE: 134 MMHG | DIASTOLIC BLOOD PRESSURE: 64 MMHG

## 2023-02-15 VITALS — SYSTOLIC BLOOD PRESSURE: 132 MMHG | DIASTOLIC BLOOD PRESSURE: 63 MMHG

## 2023-02-15 VITALS — DIASTOLIC BLOOD PRESSURE: 68 MMHG | SYSTOLIC BLOOD PRESSURE: 139 MMHG

## 2023-02-15 VITALS — SYSTOLIC BLOOD PRESSURE: 155 MMHG | DIASTOLIC BLOOD PRESSURE: 73 MMHG

## 2023-02-15 LAB
BASOPHILS # BLD AUTO: 0 K/UL (ref 0–0.2)
BASOPHILS NFR BLD AUTO: 0 % (ref 0–2)
BUN SERPL-MCNC: 6 MG/DL (ref 7–18)
CALCIUM SERPL-MCNC: 8.9 MG/DL (ref 8.5–10.1)
CHLORIDE SERPL-SCNC: 100 MMOL/L (ref 98–107)
CO2 SERPL-SCNC: 23 MMOL/L (ref 21–32)
CREAT SERPL-MCNC: 1.1 MG/DL (ref 0.6–1.3)
EOSINOPHIL NFR BLD AUTO: 0.6 % (ref 0–6)
GLUCOSE SERPL-MCNC: 130 MG/DL (ref 74–106)
HCT VFR BLD AUTO: 33 % (ref 39–51)
HGB BLD-MCNC: 10.8 G/DL (ref 13.5–17.5)
LYMPHOCYTES NFR BLD AUTO: 1.8 K/UL (ref 0.8–4.8)
LYMPHOCYTES NFR BLD AUTO: 25.8 % (ref 20–44)
MCHC RBC AUTO-ENTMCNC: 32 G/DL (ref 31–36)
MCV RBC AUTO: 82 FL (ref 80–96)
MONOCYTES NFR BLD AUTO: 0.8 K/UL (ref 0.1–1.3)
MONOCYTES NFR BLD AUTO: 11.3 % (ref 2–12)
NEUTROPHILS # BLD AUTO: 4.2 K/UL (ref 1.8–8.9)
NEUTROPHILS NFR BLD AUTO: 62.3 % (ref 43–81)
PLATELET # BLD AUTO: 579 K/UL (ref 150–450)
POTASSIUM SERPL-SCNC: 4 MMOL/L (ref 3.5–5.1)
RBC # BLD AUTO: 4.05 MIL/UL (ref 4.5–6)
SODIUM SERPL-SCNC: 136 MMOL/L (ref 136–145)
WBC NRBC COR # BLD AUTO: 6.8 K/UL (ref 4.3–11)

## 2023-02-15 PROCEDURE — 05HD33Z INSERTION OF INFUSION DEVICE INTO RIGHT CEPHALIC VEIN, PERCUTANEOUS APPROACH: ICD-10-PCS | Performed by: NURSE PRACTITIONER

## 2023-02-15 RX ADMIN — DICYCLOMINE HYDROCHLORIDE SCH MG: 10 CAPSULE ORAL at 00:04

## 2023-02-15 RX ADMIN — Medication SCH EACH: at 17:10

## 2023-02-15 RX ADMIN — Medication SCH MG: at 17:07

## 2023-02-15 RX ADMIN — Medication SCH MLS/HR: at 16:39

## 2023-02-15 RX ADMIN — Medication SCH MG: at 08:27

## 2023-02-15 RX ADMIN — OLANZAPINE SCH MG: 10 TABLET ORAL at 22:33

## 2023-02-15 RX ADMIN — Medication SCH EACH: at 11:47

## 2023-02-15 RX ADMIN — Medication SCH EACH: at 06:57

## 2023-02-15 RX ADMIN — Medication PRN TAB: at 20:38

## 2023-02-15 RX ADMIN — MIRTAZAPINE SCH MG: 15 TABLET, FILM COATED ORAL at 22:33

## 2023-02-15 RX ADMIN — DICYCLOMINE HYDROCHLORIDE SCH MG: 10 CAPSULE ORAL at 12:08

## 2023-02-15 RX ADMIN — OLANZAPINE SCH MG: 10 TABLET ORAL at 00:04

## 2023-02-15 RX ADMIN — DEXTROSE MONOHYDRATE SCH MLS/HR: 50 INJECTION, SOLUTION INTRAVENOUS at 16:07

## 2023-02-15 RX ADMIN — DICYCLOMINE HYDROCHLORIDE SCH MG: 10 CAPSULE ORAL at 17:12

## 2023-02-15 RX ADMIN — DEXTROSE MONOHYDRATE SCH MLS/HR: 50 INJECTION, SOLUTION INTRAVENOUS at 23:35

## 2023-02-15 RX ADMIN — INSULIN HUMAN PRN UNITS: 100 INJECTION, SOLUTION PARENTERAL at 17:10

## 2023-02-15 RX ADMIN — Medication SCH EACH: at 22:33

## 2023-02-15 RX ADMIN — PANTOPRAZOLE SODIUM SCH MG: 40 TABLET, DELAYED RELEASE ORAL at 06:56

## 2023-02-15 RX ADMIN — TAMSULOSIN HYDROCHLORIDE SCH MG: 0.4 CAPSULE ORAL at 22:33

## 2023-02-15 RX ADMIN — SODIUM CHLORIDE SCH MLS/HR: 9 INJECTION, SOLUTION INTRAVENOUS at 14:23

## 2023-02-15 RX ADMIN — INSULIN HUMAN PRN UNITS: 100 INJECTION, SOLUTION PARENTERAL at 11:46

## 2023-02-15 RX ADMIN — Medication SCH MLS/HR: at 00:10

## 2023-02-15 RX ADMIN — Medication SCH MG: at 08:17

## 2023-02-15 RX ADMIN — MIRTAZAPINE SCH MG: 15 TABLET, FILM COATED ORAL at 00:04

## 2023-02-15 RX ADMIN — DICYCLOMINE HYDROCHLORIDE SCH MG: 10 CAPSULE ORAL at 06:57

## 2023-02-15 RX ADMIN — DEXTROSE MONOHYDRATE SCH MLS/HR: 50 INJECTION, SOLUTION INTRAVENOUS at 01:49

## 2023-02-15 RX ADMIN — Medication SCH MG: at 08:26

## 2023-02-15 RX ADMIN — Medication SCH MG: at 12:08

## 2023-02-15 RX ADMIN — HUMAN INSULIN PRN UNIT: 100 INJECTION, SOLUTION SUBCUTANEOUS at 22:36

## 2023-02-15 RX ADMIN — TAMSULOSIN HYDROCHLORIDE SCH MG: 0.4 CAPSULE ORAL at 00:04

## 2023-02-15 RX ADMIN — Medication SCH MG: at 17:00

## 2023-02-15 RX ADMIN — Medication SCH MLS/HR: at 09:54

## 2023-02-15 NOTE — NUR
RN NOTES



PATIENT WAS ABLE TO PULL OUT THE IV AGAIN. REFUSED TO HAVE A NEW ONE. WILL CONTINUE TO 
MONITOR PATIENT.

## 2023-02-15 NOTE — NUR
RN TELE NOTES

PT AWAKE AND ALERT, WITH PERIODS OF CONFUSION, AMBULATES TO THE BATHROOM WITH STEADY GAIT, 
NO COMPLAINT OF PAIN, NOT IN DISTRESS, CALL LIGHT WITHIN REACH, BED ALARM ON, NEEDS 
ATTENDED.

## 2023-02-15 NOTE — NUR
RN MS NOTES

PT SEEN AND EXAMINED BY DR. CLINTON, PT IN BED, AWAKE, ABLE TO MAKE NEEDS KNOWN, PT HAD 
EPISODES OF WALKING ALONG THE HALLWAY, UNABLE TO FOLLOW DIRECTIONS, WITH CONFUSION, MD MADE 
AWARE, PT NOW LYING IN BED, CALM AT THIS TIME. Vermilion Lips Filler Volume In Cc: 1

## 2023-02-15 NOTE — NUR
TELE RN CLOSING NOTES



PATIENT IN BED AWAKE AND ALERT AT THIS TIME. A/O X 2-3. Danish SPEAKING ONLY. ON ROOM AIR, 
WITH EQUAL AND UNLABORED BREATHING, NO SIGNS OF RESPIRATORY DISTRESS. NO IV ACCESS AND TELE 
MONITOR AT THIS TIME, PATIENT REMOVED. MIDLINE INSERTION ORDERED, NURSING SUPERVISOR 
INFORMED. PATIENT ON BILATERAL SOFT WRIST RESTRAINT DUE TO RESTLESSNESS. PATIENT WAS GOING 
IN AND OUT OF THE BED DURING EARLIER SHIFT. SAFETY MEASURES MAINTAINED. BED LOCKED AND IN 
LOWEST POSITION, SIDE RAILS UP X2, BED ALARM ON, CALL LIGHT AND TRAY TABLE WITHIN REACH. 
WILL ENDORSE TO THE NEXT SHIFT.

## 2023-02-16 VITALS — SYSTOLIC BLOOD PRESSURE: 143 MMHG | DIASTOLIC BLOOD PRESSURE: 79 MMHG

## 2023-02-16 VITALS — DIASTOLIC BLOOD PRESSURE: 61 MMHG | SYSTOLIC BLOOD PRESSURE: 148 MMHG

## 2023-02-16 VITALS — DIASTOLIC BLOOD PRESSURE: 52 MMHG | SYSTOLIC BLOOD PRESSURE: 148 MMHG

## 2023-02-16 LAB
BASOPHILS # BLD AUTO: 0 K/UL (ref 0–0.2)
BASOPHILS NFR BLD AUTO: 0.1 % (ref 0–2)
BUN SERPL-MCNC: 8 MG/DL (ref 7–18)
CALCIUM SERPL-MCNC: 8.3 MG/DL (ref 8.5–10.1)
CHLORIDE SERPL-SCNC: 99 MMOL/L (ref 98–107)
CO2 SERPL-SCNC: 26 MMOL/L (ref 21–32)
CREAT SERPL-MCNC: 1 MG/DL (ref 0.6–1.3)
EOSINOPHIL NFR BLD AUTO: 1.8 % (ref 0–6)
GLUCOSE SERPL-MCNC: 333 MG/DL (ref 74–106)
HCT VFR BLD AUTO: 30 % (ref 39–51)
HGB BLD-MCNC: 9.3 G/DL (ref 13.5–17.5)
LYMPHOCYTES NFR BLD AUTO: 1.9 K/UL (ref 0.8–4.8)
LYMPHOCYTES NFR BLD AUTO: 24.6 % (ref 20–44)
MAGNESIUM SERPL-MCNC: 2.1 MG/DL (ref 1.8–2.4)
MCHC RBC AUTO-ENTMCNC: 31 G/DL (ref 31–36)
MCV RBC AUTO: 84 FL (ref 80–96)
MONOCYTES NFR BLD AUTO: 0.7 K/UL (ref 0.1–1.3)
MONOCYTES NFR BLD AUTO: 9.4 % (ref 2–12)
NEUTROPHILS # BLD AUTO: 4.9 K/UL (ref 1.8–8.9)
NEUTROPHILS NFR BLD AUTO: 64.1 % (ref 43–81)
PLATELET # BLD AUTO: 481 K/UL (ref 150–450)
POTASSIUM SERPL-SCNC: 4.2 MMOL/L (ref 3.5–5.1)
RBC # BLD AUTO: 3.62 MIL/UL (ref 4.5–6)
SODIUM SERPL-SCNC: 131 MMOL/L (ref 136–145)
WBC NRBC COR # BLD AUTO: 7.6 K/UL (ref 4.3–11)

## 2023-02-16 RX ADMIN — OLANZAPINE SCH MG: 10 TABLET ORAL at 21:48

## 2023-02-16 RX ADMIN — Medication SCH MG: at 17:23

## 2023-02-16 RX ADMIN — DEXTROSE MONOHYDRATE SCH MLS/HR: 50 INJECTION, SOLUTION INTRAVENOUS at 15:58

## 2023-02-16 RX ADMIN — Medication SCH MLS/HR: at 00:11

## 2023-02-16 RX ADMIN — Medication SCH MG: at 12:05

## 2023-02-16 RX ADMIN — DICYCLOMINE HYDROCHLORIDE SCH MG: 10 CAPSULE ORAL at 06:38

## 2023-02-16 RX ADMIN — INSULIN HUMAN PRN UNITS: 100 INJECTION, SOLUTION PARENTERAL at 17:33

## 2023-02-16 RX ADMIN — Medication SCH MG: at 08:55

## 2023-02-16 RX ADMIN — Medication PRN TAB: at 02:31

## 2023-02-16 RX ADMIN — TAMSULOSIN HYDROCHLORIDE SCH MG: 0.4 CAPSULE ORAL at 21:48

## 2023-02-16 RX ADMIN — Medication SCH MG: at 08:52

## 2023-02-16 RX ADMIN — Medication SCH EACH: at 22:57

## 2023-02-16 RX ADMIN — Medication SCH MLS/HR: at 17:02

## 2023-02-16 RX ADMIN — Medication SCH EACH: at 07:06

## 2023-02-16 RX ADMIN — INSULIN HUMAN PRN UNITS: 100 INJECTION, SOLUTION PARENTERAL at 23:04

## 2023-02-16 RX ADMIN — Medication SCH EACH: at 11:59

## 2023-02-16 RX ADMIN — DICYCLOMINE HYDROCHLORIDE SCH MG: 10 CAPSULE ORAL at 17:22

## 2023-02-16 RX ADMIN — HUMAN INSULIN PRN UNIT: 100 INJECTION, SOLUTION SUBCUTANEOUS at 07:09

## 2023-02-16 RX ADMIN — DICYCLOMINE HYDROCHLORIDE SCH MG: 10 CAPSULE ORAL at 12:06

## 2023-02-16 RX ADMIN — INSULIN HUMAN PRN UNITS: 100 INJECTION, SOLUTION PARENTERAL at 12:03

## 2023-02-16 RX ADMIN — MIRTAZAPINE SCH MG: 15 TABLET, FILM COATED ORAL at 21:48

## 2023-02-16 RX ADMIN — Medication SCH MG: at 17:25

## 2023-02-16 RX ADMIN — DICYCLOMINE HYDROCHLORIDE SCH MG: 10 CAPSULE ORAL at 00:10

## 2023-02-16 RX ADMIN — Medication SCH MLS/HR: at 08:50

## 2023-02-16 RX ADMIN — SODIUM CHLORIDE SCH MLS/HR: 9 INJECTION, SOLUTION INTRAVENOUS at 14:40

## 2023-02-16 RX ADMIN — Medication SCH EACH: at 17:32

## 2023-02-16 RX ADMIN — PANTOPRAZOLE SODIUM SCH MG: 40 TABLET, DELAYED RELEASE ORAL at 06:39

## 2023-02-16 NOTE — NUR
RM OPENING NOTE- PATIENT IN BED AWAKE AND ALERT AT THIS TIME. A/O X 2-3. Surinamese SPEAKING 
ONLY. CONFUSED AT TIMES. NO IV ACCESS AND TELE MONITOR AT THIS TIME, HEATHER CURRY #18G, SAFETY 
MEASURES MAINTAINED. BED LOCKED AND IN LOWEST POSITION, SIDE RAILS UP X2, BED ALARM ON, CALL 
LIGHT AND TRAY TABLE WITHIN REACH. MONITOR / ASSIST

## 2023-02-16 NOTE — NUR
RN OPENING NOTES



PT IS ASLEEP IN BED. A/O X 2-3, Uzbek SPEAKING. PT IN RA, TOLERATING WELL, BREATHING EVEN 
AND UNLABORED @ THIS TIME. PT IV ACCESS PRESENT @  HEATHER ML #18G, PATENT, INTACT AND FLUSHES 
WELL, WITH NO S/S OF INFILTRATION @ SITE NOTED. SAFETY MEASURES IN PLACE, BED AT ITS LOWEST 
AND LOCKED POSITION, SIDE RAILS X 3, BEDSIDE TABLE AND CALL LIGHT IS EASY REACH. BED ALARM 
IS ON. WILL CONTIINUE TO MONITOR PATIENT ACCORDINGLY.

## 2023-02-16 NOTE — NUR
RN CLOSING NOTE- PATIENT IN BED  A/O X 2-3. Guatemalan SPEAKING ONLY. CONFUSED AT TIMES., HEATHER 
ML #18G, SAFETY MEASURES MAINTAINED. BED LOCKED AND IN LOWEST POSITION, SIDE RAILS UP X2, 
BED ALARM ON, CALL LIGHT AND TRAY TABLE WITHIN REACH. FOR POSS DC TOMORROW. MONITOR / ASSIST

## 2023-02-17 VITALS — DIASTOLIC BLOOD PRESSURE: 71 MMHG | SYSTOLIC BLOOD PRESSURE: 158 MMHG

## 2023-02-17 VITALS — DIASTOLIC BLOOD PRESSURE: 59 MMHG | SYSTOLIC BLOOD PRESSURE: 113 MMHG

## 2023-02-17 VITALS — SYSTOLIC BLOOD PRESSURE: 121 MMHG | DIASTOLIC BLOOD PRESSURE: 59 MMHG

## 2023-02-17 LAB
BASOPHILS # BLD AUTO: 0 K/UL (ref 0–0.2)
BASOPHILS NFR BLD AUTO: 0.2 % (ref 0–2)
BUN SERPL-MCNC: 10 MG/DL (ref 7–18)
CALCIUM SERPL-MCNC: 8.6 MG/DL (ref 8.5–10.1)
CHLORIDE SERPL-SCNC: 95 MMOL/L (ref 98–107)
CO2 SERPL-SCNC: 30 MMOL/L (ref 21–32)
CREAT SERPL-MCNC: 1 MG/DL (ref 0.6–1.3)
EOSINOPHIL NFR BLD AUTO: 1.7 % (ref 0–6)
EOSINOPHIL NFR BLD MANUAL: 1 % (ref 0–4)
GLUCOSE SERPL-MCNC: 340 MG/DL (ref 74–106)
HCT VFR BLD AUTO: 32 % (ref 39–51)
HGB BLD-MCNC: 10.6 G/DL (ref 13.5–17.5)
LYMPHOCYTES NFR BLD AUTO: 16.2 % (ref 20–44)
LYMPHOCYTES NFR BLD AUTO: 2.3 K/UL (ref 0.8–4.8)
LYMPHOCYTES NFR BLD MANUAL: 17 % (ref 16–48)
MCHC RBC AUTO-ENTMCNC: 33 G/DL (ref 31–36)
MCV RBC AUTO: 83 FL (ref 80–96)
MONOCYTES NFR BLD AUTO: 0.6 K/UL (ref 0.1–1.3)
MONOCYTES NFR BLD AUTO: 3.9 % (ref 2–12)
MONOCYTES NFR BLD MANUAL: 2 % (ref 0–11)
NEUTROPHILS # BLD AUTO: 11 K/UL (ref 1.8–8.9)
NEUTROPHILS NFR BLD AUTO: 78 % (ref 43–81)
NEUTS BAND NFR BLD MANUAL: 3 % (ref 0–5)
NEUTS SEG NFR BLD MANUAL: 77 % (ref 42–76)
PLATELET # BLD AUTO: 689 K/UL (ref 150–450)
POTASSIUM SERPL-SCNC: 3.6 MMOL/L (ref 3.5–5.1)
RBC # BLD AUTO: 3.89 MIL/UL (ref 4.5–6)
SODIUM SERPL-SCNC: 130 MMOL/L (ref 136–145)
WBC NRBC COR # BLD AUTO: 14.1 K/UL (ref 4.3–11)

## 2023-02-17 RX ADMIN — PANTOPRAZOLE SODIUM SCH MG: 40 TABLET, DELAYED RELEASE ORAL at 07:30

## 2023-02-17 RX ADMIN — SODIUM CHLORIDE SCH MLS/HR: 9 INJECTION, SOLUTION INTRAVENOUS at 14:39

## 2023-02-17 RX ADMIN — INSULIN HUMAN PRN UNITS: 100 INJECTION, SOLUTION PARENTERAL at 16:57

## 2023-02-17 RX ADMIN — Medication SCH MLS/HR: at 16:57

## 2023-02-17 RX ADMIN — INSULIN HUMAN PRN UNITS: 100 INJECTION, SOLUTION PARENTERAL at 06:15

## 2023-02-17 RX ADMIN — DICYCLOMINE HYDROCHLORIDE SCH MG: 10 CAPSULE ORAL at 06:41

## 2023-02-17 RX ADMIN — Medication SCH MLS/HR: at 00:02

## 2023-02-17 RX ADMIN — DICYCLOMINE HYDROCHLORIDE SCH MG: 10 CAPSULE ORAL at 12:10

## 2023-02-17 RX ADMIN — Medication SCH EACH: at 06:13

## 2023-02-17 RX ADMIN — OLANZAPINE SCH MG: 10 TABLET ORAL at 21:55

## 2023-02-17 RX ADMIN — INSULIN HUMAN PRN UNITS: 100 INJECTION, SOLUTION PARENTERAL at 12:12

## 2023-02-17 RX ADMIN — PANTOPRAZOLE SODIUM SCH MG: 40 TABLET, DELAYED RELEASE ORAL at 06:41

## 2023-02-17 RX ADMIN — Medication SCH MG: at 16:57

## 2023-02-17 RX ADMIN — Medication SCH MG: at 08:28

## 2023-02-17 RX ADMIN — Medication SCH MLS/HR: at 08:36

## 2023-02-17 RX ADMIN — GABAPENTIN SCH MG: 300 CAPSULE ORAL at 17:04

## 2023-02-17 RX ADMIN — DICYCLOMINE HYDROCHLORIDE SCH MG: 10 CAPSULE ORAL at 00:00

## 2023-02-17 RX ADMIN — GABAPENTIN SCH MG: 300 CAPSULE ORAL at 08:27

## 2023-02-17 RX ADMIN — DEXTROSE MONOHYDRATE SCH MLS/HR: 50 INJECTION, SOLUTION INTRAVENOUS at 15:46

## 2023-02-17 RX ADMIN — DEXTROSE MONOHYDRATE SCH MLS/HR: 50 INJECTION, SOLUTION INTRAVENOUS at 01:34

## 2023-02-17 RX ADMIN — Medication SCH MG: at 08:37

## 2023-02-17 RX ADMIN — HUMAN INSULIN PRN UNIT: 100 INJECTION, SOLUTION SUBCUTANEOUS at 22:05

## 2023-02-17 RX ADMIN — Medication SCH EACH: at 22:01

## 2023-02-17 RX ADMIN — AMLODIPINE BESYLATE SCH MG: 5 TABLET ORAL at 08:28

## 2023-02-17 RX ADMIN — Medication SCH EACH: at 17:05

## 2023-02-17 RX ADMIN — Medication SCH MG: at 17:05

## 2023-02-17 RX ADMIN — Medication SCH MG: at 08:27

## 2023-02-17 RX ADMIN — Medication SCH MG: at 12:11

## 2023-02-17 RX ADMIN — DICYCLOMINE HYDROCHLORIDE SCH MG: 10 CAPSULE ORAL at 17:05

## 2023-02-17 RX ADMIN — Medication SCH EACH: at 12:10

## 2023-02-17 NOTE — NUR
RN NOTES



PATIENT RECEIVED PROTONIX MEDICATION ALREADY FROM NIGHT SHIFT. NON-ADMIN PER PHARMACIST 
JUMA

## 2023-02-17 NOTE — NUR
MS RN OPENING NOTES - RECEIVED PATIENT SLEEPING IN BED, EASY TO AROUSE. A/O X2, WITH PERIODS 
OF CONFUSION. ABLE TO SPEAK SOME ENGLISH. BREATHING EVEN AND NON-LABORED ON ROOM AIR. NOT IN 
ACUTE DISTRESS. NO C/O PAIN OR DISCOMFORT AT THIS TIME. HAS RIGHT UPPER ARM MIDLINE #18G AND 
SALINE LOCKED. NO S/S OF INFILTRATION NOTED. SAFETY MEASURES IN PLACE: BED LOCKED AND IN LOW 
POSITION, SIDE RAILS UP X2, BED ALARM ON, CALL LIGHT WITHIN REACH. WILL CONTINUE PLAN OF 
CARE.

## 2023-02-17 NOTE — NUR
RN NOTES



PATIENT BS HIGH 500, MD NOTIFIED, NO NEW ORDER, ADMINISTERED 15 UNITS PER SLIDING SCALE.

## 2023-02-17 NOTE — NUR
RN NOTES



PATIENT BS HIGH 495, MD NOTIFIED. COVERAGE GIVEN PER SLIDING SCALE AND ORDERED LANTUS 14 
UNITS QD. WILL CONTINUE TO MONITOR.

## 2023-02-17 NOTE — NUR
MS RN OPENING NOTES



RECEIVED PATIENT SLEEPING IN BED, Icelandic SPEAKING A/Ox1-2, EPISODES OF CONFUSION. ON ROOM 
AIR, NO S/S OF RESPIRATORY DISTRESS OF DISCOMFORT. PATIENT IV ACCESS HEATHER ML S/L. INTACT AND 
PATENT. PATIENT AMBULATORY, HAS BATHROOM PRIVILEGE. PATIENT HAS BILATERAL SOFT WRIST 
RESTRAINTS AT BEDSIDE BUT NO LONGER USING AS PATIENT IS MORE COMPLIANT WITH CARE. SAFETY 
MEASURES IN PLACE: BED LOCKED AND IN LOWEST POSITION, HOB ELEVATED, SIDE RAILS UPx2, CALL 
LIGHT WITHIN REACH. WILL CONTINUE TO MONITOR.

## 2023-02-17 NOTE — NUR
RN CLOSING NOTES



PT IS DOZING INTERMITTENLY IN BED A/O X 2-3, Turkish SPEAKING. PT IN RA, TOLERATING WELL, 
BREATHING EVEN AND UNLABORED @ THIS TIME. PT IV ACCESS PRESENT @  HEATHER ML #18G, PATENT, 
INTACT AND FLUSHES WELL, WITH NO S/S OF INFILTRATION @ SITE NOTED. PT WALKS INDEPENDENLY TO 
GO TO THE BATHROOM.  MEDICATION ADMINISTERED AS PER MD'S ORDER. SAFETY MEASURES IN PLACE, 
BED AT ITS LOWEST AND LOCKED POSITION, SIDE RAILS X 3, BEDSIDE TABLE AND CALL LIGHT IS EASY 
REACH. BED ALARM IS ON. WILL CONTIINUE TO MONITOR PATIENT ACCORDINGLY.

## 2023-02-17 NOTE — NUR
MS RN CLOSING NOTES



PATIENT SLEEPING IN BED, [place]ran SPEAKING A/Ox1-2, EPISODES OF CONFUSION. STABLE ON ROOM 
AIR, NO S/S OF RESPIRATORY DISTRESS OF DISCOMFORT. PATIENT IV ACCESS HEATHER ML S/L. INTACT AND 
PATENT. PATIENT AMBULATORY, HAS BATHROOM PRIVILEGE. PATIENT HAS BILATERAL SOFT WRIST 
RESTRAINTS AT BEDSIDE BUT NO LONGER USING AS PATIENT IS MORE COMPLIANT WITH CARE. SAFETY 
MEASURES MAINTAINED: BED LOCKED AND IN LOWEST POSITION, HOB ELEVATED, SIDE RAILS UPx2, CALL 
LIGHT WITHIN REACH. WILL ENDORSE TO NEXT SHIFT ANY GILBERT.

## 2023-02-18 VITALS — SYSTOLIC BLOOD PRESSURE: 155 MMHG | DIASTOLIC BLOOD PRESSURE: 68 MMHG

## 2023-02-18 VITALS — SYSTOLIC BLOOD PRESSURE: 153 MMHG | DIASTOLIC BLOOD PRESSURE: 68 MMHG

## 2023-02-18 LAB
ALBUMIN SERPL BCP-MCNC: 2.1 G/DL (ref 3.4–5)
ALP SERPL-CCNC: 108 U/L (ref 46–116)
ALT SERPL W P-5'-P-CCNC: 15 U/L (ref 12–78)
AST SERPL W P-5'-P-CCNC: 19 U/L (ref 15–37)
BASOPHILS # BLD AUTO: 0 K/UL (ref 0–0.2)
BASOPHILS NFR BLD AUTO: 0.1 % (ref 0–2)
BILIRUB SERPL-MCNC: 0.2 MG/DL (ref 0.2–1)
BUN SERPL-MCNC: 12 MG/DL (ref 7–18)
CALCIUM SERPL-MCNC: 8.9 MG/DL (ref 8.5–10.1)
CHLORIDE SERPL-SCNC: 100 MMOL/L (ref 98–107)
CO2 SERPL-SCNC: 30 MMOL/L (ref 21–32)
CREAT SERPL-MCNC: 0.9 MG/DL (ref 0.6–1.3)
EOSINOPHIL NFR BLD AUTO: 1.2 % (ref 0–6)
EOSINOPHIL NFR BLD MANUAL: 4 % (ref 0–4)
GLUCOSE SERPL-MCNC: 75 MG/DL (ref 74–106)
HCT VFR BLD AUTO: 31 % (ref 39–51)
HGB BLD-MCNC: 9.9 G/DL (ref 13.5–17.5)
LYMPHOCYTES NFR BLD AUTO: 20.5 % (ref 20–44)
LYMPHOCYTES NFR BLD AUTO: 3.2 K/UL (ref 0.8–4.8)
LYMPHOCYTES NFR BLD MANUAL: 16 % (ref 16–48)
MCHC RBC AUTO-ENTMCNC: 32 G/DL (ref 31–36)
MCV RBC AUTO: 82 FL (ref 80–96)
MONOCYTES NFR BLD AUTO: 1 K/UL (ref 0.1–1.3)
MONOCYTES NFR BLD AUTO: 6.2 % (ref 2–12)
MONOCYTES NFR BLD MANUAL: 2 % (ref 0–11)
NEUTROPHILS # BLD AUTO: 11.4 K/UL (ref 1.8–8.9)
NEUTROPHILS NFR BLD AUTO: 72 % (ref 43–81)
NEUTS BAND NFR BLD MANUAL: 6 % (ref 0–5)
NEUTS SEG NFR BLD MANUAL: 72 % (ref 42–76)
PLATELET # BLD AUTO: 666 K/UL (ref 150–450)
POTASSIUM SERPL-SCNC: 3.5 MMOL/L (ref 3.5–5.1)
PROT SERPL-MCNC: 6.3 G/DL (ref 6.4–8.2)
RBC # BLD AUTO: 3.77 MIL/UL (ref 4.5–6)
SODIUM SERPL-SCNC: 138 MMOL/L (ref 136–145)
WBC NRBC COR # BLD AUTO: 15.8 K/UL (ref 4.3–11)

## 2023-02-18 RX ADMIN — PANTOPRAZOLE SODIUM SCH MG: 40 TABLET, DELAYED RELEASE ORAL at 08:20

## 2023-02-18 RX ADMIN — INSULIN HUMAN PRN UNITS: 100 INJECTION, SOLUTION PARENTERAL at 11:47

## 2023-02-18 RX ADMIN — INSULIN HUMAN PRN UNITS: 100 INJECTION, SOLUTION PARENTERAL at 06:32

## 2023-02-18 RX ADMIN — Medication SCH MG: at 08:20

## 2023-02-18 RX ADMIN — DICYCLOMINE HYDROCHLORIDE SCH MG: 10 CAPSULE ORAL at 06:14

## 2023-02-18 RX ADMIN — GABAPENTIN SCH MG: 300 CAPSULE ORAL at 08:21

## 2023-02-18 RX ADMIN — Medication SCH EACH: at 11:46

## 2023-02-18 RX ADMIN — Medication SCH MLS/HR: at 08:20

## 2023-02-18 RX ADMIN — DICYCLOMINE HYDROCHLORIDE SCH MG: 10 CAPSULE ORAL at 00:25

## 2023-02-18 RX ADMIN — DEXTROSE MONOHYDRATE SCH MLS/HR: 50 INJECTION, SOLUTION INTRAVENOUS at 15:10

## 2023-02-18 RX ADMIN — AMLODIPINE BESYLATE SCH MG: 5 TABLET ORAL at 08:21

## 2023-02-18 RX ADMIN — Medication SCH MG: at 08:21

## 2023-02-18 RX ADMIN — Medication SCH MG: at 12:25

## 2023-02-18 RX ADMIN — Medication SCH EACH: at 06:32

## 2023-02-18 RX ADMIN — Medication SCH MLS/HR: at 00:24

## 2023-02-18 RX ADMIN — DICYCLOMINE HYDROCHLORIDE SCH MG: 10 CAPSULE ORAL at 12:24

## 2023-02-18 NOTE — NUR
RN NOTE



Received order for discharge. Patient is A/O x 2, confused at times. Stable on room air, 
breathing evenly and unlabored. No SOB or s/s of distress noted. Report given to JENNI Rodriguez of 
[facility]. IV access removed, pressure dressing applied. Exitcare given to EMT. 
Patient left in stable condition via ambulance with 2 EMTs.

## 2023-02-18 NOTE — NUR
RN NOTE



Called [facility] to give report. No answer. Will try again later.

## 2023-02-18 NOTE — NUR
MS RN OPENING NOTE



Patient in bed, awake. A/O x 2, confused at times. On room air, breathing evenly and 
unlabored. No SOB or s/s or distress noted. IV access on HEATHER midline SL, intact and patent. 
Safety precautions in place: bed in low, locked position; siderails up x 2; call light 
within reach. Will continue to monitor.

## 2023-02-18 NOTE — NUR
MS RN CLOSING NOTES - PATIENT SLEPT WELL. NO ACUTE DISTRESS THROUGHOUT THE NIGHT. ABLE TO 
VERBALIZE NEEDS. AMBULATORY WITH BRP. NO SOB OR  NOTED, TOLERATING ROOM AIR WELL. DENIES 
PAIN AT THIS TIME. AFEBRILE. RIGHT UPPER ARM MIDLINE #18G INTACT, PATENT AND FLUSHING. ALL 
DUE MEDS GIVEN AND NEEDS ATTENDED. SAFETY MEASURES MAINTAINED. WILL ENDORSE TO NEXT SHIFT 
FOR [facility].